# Patient Record
Sex: FEMALE | Race: WHITE | NOT HISPANIC OR LATINO | Employment: FULL TIME | URBAN - METROPOLITAN AREA
[De-identification: names, ages, dates, MRNs, and addresses within clinical notes are randomized per-mention and may not be internally consistent; named-entity substitution may affect disease eponyms.]

---

## 2017-05-30 RX ORDER — LEVOTHYROXINE SODIUM 0.05 MG/1
TABLET ORAL
Qty: 90 TABLET | Refills: 0 | Status: SHIPPED | OUTPATIENT
Start: 2017-05-30 | End: 2017-09-03 | Stop reason: SDUPTHER

## 2017-09-03 RX ORDER — LEVOTHYROXINE SODIUM 0.05 MG/1
TABLET ORAL
Qty: 90 TABLET | Refills: 0 | Status: SHIPPED | OUTPATIENT
Start: 2017-09-03 | End: 2017-12-07 | Stop reason: SDUPTHER

## 2017-12-07 RX ORDER — LEVOTHYROXINE SODIUM 0.05 MG/1
TABLET ORAL
Qty: 30 TABLET | Refills: 0 | Status: SHIPPED | OUTPATIENT
Start: 2017-12-07 | End: 2018-01-09 | Stop reason: SDUPTHER

## 2018-01-09 RX ORDER — LEVOTHYROXINE SODIUM 0.05 MG/1
TABLET ORAL
Qty: 30 TABLET | Refills: 0 | Status: SHIPPED | OUTPATIENT
Start: 2018-01-09 | End: 2018-01-26 | Stop reason: SDUPTHER

## 2018-01-26 ENCOUNTER — OFFICE VISIT (OUTPATIENT)
Dept: ENDOCRINOLOGY | Facility: CLINIC | Age: 33
End: 2018-01-26

## 2018-01-26 VITALS
OXYGEN SATURATION: 99 % | BODY MASS INDEX: 37.21 KG/M2 | HEIGHT: 63 IN | SYSTOLIC BLOOD PRESSURE: 124 MMHG | DIASTOLIC BLOOD PRESSURE: 78 MMHG | HEART RATE: 67 BPM | WEIGHT: 210 LBS

## 2018-01-26 DIAGNOSIS — E78.1 PRIMARY HYPERTRIGLYCERIDEMIA: ICD-10-CM

## 2018-01-26 DIAGNOSIS — E03.9 ACQUIRED HYPOTHYROIDISM: ICD-10-CM

## 2018-01-26 DIAGNOSIS — E78.2 MIXED HYPERLIPIDEMIA: Primary | ICD-10-CM

## 2018-01-26 DIAGNOSIS — E55.9 VITAMIN D DEFICIENCY: ICD-10-CM

## 2018-01-26 LAB
ALBUMIN SERPL-MCNC: 4.3 G/DL (ref 3.2–4.8)
ALBUMIN/GLOB SERPL: 2 G/DL (ref 1.5–2.5)
ALP SERPL-CCNC: 60 U/L (ref 25–100)
ALT SERPL-CCNC: 12 U/L (ref 7–40)
AST SERPL-CCNC: 14 U/L (ref 0–33)
BILIRUB SERPL-MCNC: 0.6 MG/DL (ref 0.3–1.2)
BUN SERPL-MCNC: 17 MG/DL (ref 9–23)
BUN/CREAT SERPL: 21.3 (ref 7–25)
CALCIUM SERPL-MCNC: 9.5 MG/DL (ref 8.7–10.4)
CHLORIDE SERPL-SCNC: 105 MMOL/L (ref 99–109)
CHOLEST SERPL-MCNC: 204 MG/DL (ref 0–200)
CO2 SERPL-SCNC: 26 MMOL/L (ref 20–31)
CREAT SERPL-MCNC: 0.8 MG/DL (ref 0.6–1.3)
GFR SERPLBLD CREATININE-BSD FMLA CKD-EPI: 101 ML/MIN/1.73
GFR SERPLBLD CREATININE-BSD FMLA CKD-EPI: 84 ML/MIN/1.73
GLOBULIN SER CALC-MCNC: 2.2 GM/DL
GLUCOSE SERPL-MCNC: 88 MG/DL (ref 70–100)
HDLC SERPL-MCNC: 68 MG/DL (ref 40–60)
LDLC SERPL CALC-MCNC: 122 MG/DL (ref 0–100)
POTASSIUM SERPL-SCNC: 4.3 MMOL/L (ref 3.5–5.5)
PROT SERPL-MCNC: 6.5 G/DL (ref 5.7–8.2)
SODIUM SERPL-SCNC: 139 MMOL/L (ref 132–146)
T4 FREE SERPL-MCNC: 1.37 NG/DL (ref 0.89–1.76)
TRIGL SERPL-MCNC: 71 MG/DL (ref 0–150)
TSH SERPL DL<=0.005 MIU/L-ACNC: 1.13 MIU/ML (ref 0.35–5.35)
VLDLC SERPL CALC-MCNC: 14.2 MG/DL

## 2018-01-26 PROCEDURE — 99214 OFFICE O/P EST MOD 30 MIN: CPT | Performed by: INTERNAL MEDICINE

## 2018-01-26 RX ORDER — LEVOTHYROXINE SODIUM 0.05 MG/1
50 TABLET ORAL DAILY
Qty: 90 TABLET | Refills: 3 | Status: SHIPPED | OUTPATIENT
Start: 2018-01-26 | End: 2019-01-04 | Stop reason: SDUPTHER

## 2018-01-26 RX ORDER — AMMONIUM LACTATE 120 MG/G
CREAM TOPICAL AS NEEDED
COMMUNITY
End: 2023-01-16 | Stop reason: SDUPTHER

## 2018-01-26 NOTE — PROGRESS NOTES
Marly Snyder 33 y.o.  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]     Overview Note:     Impression: 12/03/2015 - no signs/ symptoms of pheo   check acth and cortisol, check renin and aldosterone;      • Primary hypertriglyceridemia [E78.1]     Overview Note:     Impression: 12/03/2015 - check flp  Impression: 05/01/2015 - check flp;      • Hyperlipidemia [E78.5]     Overview Note:     Impression: 12/03/2015 - update flp  Impression: 05/01/2015 - check flp;      • Hypothyroidism [E03.9]     Overview Note:     Impression: 12/03/2015 - check tfts  Impression: 05/01/2015 - check tfts  doing well;      • Vitamin D deficiency [E55.9]     Overview Note:     Impression: 12/03/2015 - update vitamin D level  Impression: 05/01/2015 - update vitamin D levels;        Review of Systems   Constitutional: Negative for activity change, appetite change, chills, diaphoresis, fatigue, fever and unexpected weight change.   HENT: Negative for congestion, dental problem, drooling, ear discharge, ear pain, facial swelling, hearing loss, mouth sores, nosebleeds, postnasal drip, rhinorrhea, sinus pressure, sneezing, sore throat, tinnitus, trouble swallowing and voice change.    Eyes: Negative for photophobia, pain, discharge, redness, itching and visual disturbance.   Respiratory: Negative for apnea, cough, choking, chest tightness, shortness of breath, wheezing and stridor.   "  Cardiovascular: Negative for chest pain, palpitations and leg swelling.   Gastrointestinal: Negative for abdominal distention, abdominal pain, anal bleeding, blood in stool, constipation, diarrhea, nausea, rectal pain and vomiting.   Endocrine: Negative for cold intolerance, heat intolerance, polydipsia, polyphagia and polyuria.   Genitourinary: Negative for decreased urine volume, difficulty urinating, dysuria, enuresis, flank pain, frequency, genital sores, hematuria and urgency.   Musculoskeletal: Negative for arthralgias, back pain, gait problem, joint swelling, myalgias, neck pain and neck stiffness.   Skin: Negative for color change, pallor, rash and wound.   Allergic/Immunologic: Negative for environmental allergies, food allergies and immunocompromised state.   Neurological: Negative for dizziness, tremors, seizures, syncope, facial asymmetry, speech difficulty, weakness, light-headedness, numbness and headaches.   Hematological: Negative for adenopathy. Does not bruise/bleed easily.   Psychiatric/Behavioral: Negative for agitation, behavioral problems, confusion, decreased concentration, dysphoric mood, hallucinations, self-injury, sleep disturbance and suicidal ideas. The patient is not nervous/anxious and is not hyperactive.      Social History     Social History   • Marital status: Single     Spouse name: N/A   • Number of children: N/A   • Years of education: N/A     Occupational History   • Not on file.     Social History Main Topics   • Smoking status: Never Smoker   • Smokeless tobacco: Never Used   • Alcohol use Yes      Comment: occasionally   • Drug use: No   • Sexual activity: Defer     Other Topics Concern   • Not on file     Social History Narrative     Family History   Problem Relation Age of Onset   • Hypertension Father    • Hyperlipidemia Father    • Coronary artery disease Father    • Cancer Paternal Grandfather    • Breast cancer Other      /78  Pulse 67  Ht 160 cm (63\")  Wt " 95.3 kg (210 lb)  LMP 01/18/2018  SpO2 99%  BMI 37.2 kg/m2  Physical Exam   Constitutional: She is oriented to person, place, and time. She appears well-developed and well-nourished.   HENT:   Head: Normocephalic and atraumatic.   Nose: Nose normal.   Mouth/Throat: Oropharynx is clear and moist.   Eyes: Conjunctivae, EOM and lids are normal. Pupils are equal, round, and reactive to light.   Neck: Trachea normal and normal range of motion. Neck supple. Carotid bruit is not present. No tracheal deviation present. No thyroid mass and no thyromegaly present.   Cardiovascular: Normal rate, regular rhythm, normal heart sounds and intact distal pulses.  Exam reveals no gallop and no friction rub.    No murmur heard.  Pulmonary/Chest: Effort normal and breath sounds normal. No respiratory distress. She has no wheezes.   Musculoskeletal: Normal range of motion. She exhibits no edema or deformity.   Lymphadenopathy:     She has no cervical adenopathy.   Neurological: She is alert and oriented to person, place, and time. She has normal reflexes. She displays normal reflexes. No cranial nerve deficit.   Skin: Skin is warm and dry. No rash noted. No cyanosis or erythema. Nails show no clubbing.   Psychiatric: She has a normal mood and affect. Her speech is normal and behavior is normal. Judgment and thought content normal. Cognition and memory are normal.   Nursing note and vitals reviewed.    Results for orders placed or performed in visit on 12/09/16   TSH   Result Value Ref Range    TSH 0.857 0.350 - 5.350 mIU/mL   T4, Free   Result Value Ref Range    Free T4 1.36 0.89 - 1.76 ng/dL   Comprehensive Metabolic Panel   Result Value Ref Range    Glucose 93 70 - 100 mg/dL    BUN 13 9 - 23 mg/dL    Creatinine 0.80 0.60 - 1.30 mg/dL    Sodium 140 132 - 146 mmol/L    Potassium 4.0 3.5 - 5.5 mmol/L    Chloride 105 99 - 109 mmol/L    CO2 26.0 20.0 - 31.0 mmol/L    Calcium 10.0 8.7 - 10.4 mg/dL    Total Protein 6.7 5.7 - 8.2 g/dL     Albumin 4.30 3.20 - 4.80 g/dL    ALT (SGPT) 18 7 - 40 U/L    AST (SGOT) 20 0 - 33 U/L    Alkaline Phosphatase 83 25 - 100 U/L    Total Bilirubin 0.5 0.3 - 1.2 mg/dL    eGFR Non African Amer 85 >60 mL/min/1.73    Globulin 2.4 gm/dL    A/G Ratio 1.8 g/dL    BUN/Creatinine Ratio 16.3 7.0 - 25.0    Anion Gap 9.0 3.0 - 11.0 mmol/L   DHEA-Sulfate   Result Value Ref Range    DHEA-Sulfate 214.9 84.8 - 378.0 ug/dL   Testosterone   Result Value Ref Range    Testosterone, Total 50.56 10.00 - 1500.00 ng/dL   ACTH   Result Value Ref Range    ACTH 17.3 7.2 - 63.3 pg/mL   Cortisol   Result Value Ref Range    Cortisol 13.80 mcg/dL   Catecholamines, Fractionated, Plasma   Result Value Ref Range    Norepinephrine 452 0 - 874 pg/mL    Epinephrine 19 0 - 62 pg/mL    Dopamine <30 0 - 48 pg/mL   Aldosterone   Result Value Ref Range    Aldosterone 9.3 0.0 - 30.0 ng/dL   Renin Direct Assay   Result Value Ref Range    Renin Activity 1.48 ng/mL/hr   Vitamin D 25 Hydroxy   Result Value Ref Range    25 Hydroxy, Vitamin D 25.4 ng/ml   Lipid Panel   Result Value Ref Range    Total Cholesterol 214 (H) 0 - 200 mg/dL    Triglycerides 80 0 - 150 mg/dL    HDL Cholesterol 49 40 - 60 mg/dL    LDL Cholesterol  136 (H) 0 - 130 mg/dL     Problem List Items Addressed This Visit        Cardiovascular and Mediastinum    Primary hypertriglyceridemia     See above          Relevant Orders    TSH (Completed)    T4, Free (Completed)    Comprehensive Metabolic Panel (Completed)    Lipid Panel (Completed)    Hyperlipidemia - Primary     Check flp          Relevant Orders    TSH (Completed)    T4, Free (Completed)    Comprehensive Metabolic Panel (Completed)    Lipid Panel (Completed)       Digestive    Vitamin D deficiency     Continue supplement and repeat levels yearly          Relevant Orders    TSH (Completed)    T4, Free (Completed)    Comprehensive Metabolic Panel (Completed)    Lipid Panel (Completed)       Endocrine    Hypothyroidism     Check tfts           Relevant Medications    levothyroxine (SYNTHROID, LEVOTHROID) 50 MCG tablet    Other Relevant Orders    TSH (Completed)    T4, Free (Completed)    Comprehensive Metabolic Panel (Completed)    Lipid Panel (Completed)    Adrenal cortical adenoma     Find old records- patient denies prior h/o adenoma- may be erroneous diagnosis          Relevant Orders    TSH (Completed)    T4, Free (Completed)    Comprehensive Metabolic Panel (Completed)    Lipid Panel (Completed)        Return in about 1 year (around 1/26/2019) for Recheck 30 min .    Tesha Conrad MA  Signed Vi Farfan MD

## 2018-02-14 RX ORDER — AMMONIUM LACTATE 12 G/100G
LOTION TOPICAL
Qty: 225 G | Refills: 4 | Status: SHIPPED | OUTPATIENT
Start: 2018-02-14 | End: 2019-05-02 | Stop reason: SDUPTHER

## 2018-11-07 ENCOUNTER — OFFICE VISIT (OUTPATIENT)
Dept: FAMILY MEDICINE CLINIC | Facility: CLINIC | Age: 33
End: 2018-11-07

## 2018-11-07 VITALS
OXYGEN SATURATION: 99 % | TEMPERATURE: 97.8 F | RESPIRATION RATE: 16 BRPM | HEART RATE: 80 BPM | SYSTOLIC BLOOD PRESSURE: 136 MMHG | DIASTOLIC BLOOD PRESSURE: 70 MMHG | HEIGHT: 63 IN | WEIGHT: 217 LBS | BODY MASS INDEX: 38.45 KG/M2

## 2018-11-07 DIAGNOSIS — E03.9 HYPOTHYROIDISM, UNSPECIFIED TYPE: Primary | ICD-10-CM

## 2018-11-07 PROCEDURE — 99213 OFFICE O/P EST LOW 20 MIN: CPT | Performed by: NURSE PRACTITIONER

## 2018-11-07 RX ORDER — HEPATITIS A VACCINE 1440 [IU]/ML
INJECTION, SUSPENSION INTRAMUSCULAR
Refills: 0 | COMMUNITY
Start: 2018-10-17 | End: 2020-01-10

## 2018-11-07 RX ORDER — INFLUENZA A VIRUS A/MICHIGAN/45/2015 X-275 (H1N1) ANTIGEN (FORMALDEHYDE INACTIVATED), INFLUENZA A VIRUS A/SINGAPORE/INFIMH-16-0019/2016 IVR-186 (H3N2) ANTIGEN (FORMALDEHYDE INACTIVATED), INFLUENZA B VIRUS B/PHUKET/3073/2013 ANTIGEN (FORMALDEHYDE INACTIVATED), AND INFLUENZA B VIRUS B/MARYLAND/15/2016 BX-69A ANTIGEN (FORMALDEHYDE INACTIVATED) 15; 15; 15; 15 UG/.5ML; UG/.5ML; UG/.5ML; UG/.5ML
INJECTION, SUSPENSION INTRAMUSCULAR
Refills: 0 | COMMUNITY
Start: 2018-10-17 | End: 2020-01-10

## 2018-11-07 NOTE — PROGRESS NOTES
"Marly Snyder is a 33 y.o. female. Patient presents to office today as a new patient needing to establish care. Also needs to discuss health concerns, including Hypothyroidism.  Is followed by Dr. Farfan an endocrinologist  Is followed by Ob at [facility]  Is an   Had a flu and Hep A shot and then 3 days later she has hives.          Assessment/Plan   Problem List Items Addressed This Visit     None             No Follow-up on file.  There are no Patient Instructions on file for this visit.    Chief Complaint   Patient presents with   • Annual Exam     new pt to be established; discuss hypothyroidism     Social History   Substance Use Topics   • Smoking status: Never Smoker   • Smokeless tobacco: Never Used   • Alcohol use Yes      Comment: occasionally       History of Present Illness     The following portions of the patient's history were reviewed and updated as appropriate:PMHroutine: Social history , Allergies, Current Medications, Active Problem List and Health Maintenance    Review of Systems   Constitutional: Negative for activity change, appetite change, fatigue and fever.   HENT: Negative for mouth sores.    Respiratory: Negative for cough and choking.    Cardiovascular: Negative for chest pain.   Skin: Positive for rash.   Neurological: Negative for dizziness.       Objective   Vitals:    11/07/18 0906   BP: 136/70   BP Location: Left arm   Patient Position: Sitting   Cuff Size: Adult   Pulse: 80   Resp: 16   Temp: 97.8 °F (36.6 °C)   TempSrc: Oral   SpO2: 99%   Weight: 98.4 kg (217 lb)   Height: 160 cm (62.99\")     Body mass index is 38.45 kg/m².  Physical Exam   Constitutional: She appears well-developed and well-nourished. No distress.   HENT:   Head: Normocephalic and atraumatic.   Right Ear: External ear normal.   Left Ear: External ear normal.   Mouth/Throat: Oropharynx is clear and moist.   Eyes: EOM are normal.   Neck: Neck supple.   Cardiovascular: Normal rate and regular rhythm.  "   Pulmonary/Chest: Effort normal and breath sounds normal.   Abdominal: Soft.   Musculoskeletal: Normal range of motion.   Neurological: She is alert.   Skin: Skin is warm.   Nursing note and vitals reviewed.    Reviewed Data:  No visits with results within 1 Month(s) from this visit.   Latest known visit with results is:   Office Visit on 01/26/2018   Component Date Value Ref Range Status   • TSH 01/26/2018 1.128  0.350 - 5.350 mIU/mL Final   • Free T4 01/26/2018 1.37  0.89 - 1.76 ng/dL Final   • Glucose 01/26/2018 88  70 - 100 mg/dL Final   • BUN 01/26/2018 17  9 - 23 mg/dL Final   • Creatinine 01/26/2018 0.80  0.60 - 1.30 mg/dL Final   • eGFR Non  Am 01/26/2018 84  >60 mL/min/1.73 Final    Comment: [employer] Guidelines  Stage     Description        GFR  1         Normal or High     90+  2         Mild decrease      60-89  3         Moderate decrease  30-59  4         Severe decrease    15-29  5         Kidney failure     <15     • eGFR  Am 01/26/2018 101  >60 mL/min/1.73 Final   • BUN/Creatinine Ratio 01/26/2018 21.3  7.0 - 25.0 Final   • Sodium 01/26/2018 139  132 - 146 mmol/L Final   • Potassium 01/26/2018 4.3  3.5 - 5.5 mmol/L Final   • Chloride 01/26/2018 105  99 - 109 mmol/L Final   • Total CO2 01/26/2018 26.0  20.0 - 31.0 mmol/L Final   • Calcium 01/26/2018 9.5  8.7 - 10.4 mg/dL Final   • Total Protein 01/26/2018 6.5  5.7 - 8.2 g/dL Final   • Albumin 01/26/2018 4.30  3.20 - 4.80 g/dL Final   • Globulin 01/26/2018 2.2  gm/dL Final   • A/G Ratio 01/26/2018 2.0  1.5 - 2.5 g/dL Final   • Total Bilirubin 01/26/2018 0.6  0.3 - 1.2 mg/dL Final   • Alkaline Phosphatase 01/26/2018 60  25 - 100 U/L Final   • AST (SGOT) 01/26/2018 14  0 - 33 U/L Final   • ALT (SGPT) 01/26/2018 12  7 - 40 U/L Final   • Total Cholesterol 01/26/2018 204* 0 - 200 mg/dL Final    Comment: Cholesterol Reference Ranges:   Desirable       < 200 mg/dL   Borderline    200-239 mg/dL   High Risk       > 239  mg/dL  Triglyceride Reference Ranges:   Normal          < 150 mg/dL   Borderline    150-199 mg/dL   High          200-499 mg/dL   Very High       > 499 mg/dL  HDL Reference Ranges:   Low              < 40 mg/dL   High             > 59 mg/dL  LDL Reference Ranges:   Optimal         < 100 mg/dL   Near Optimal  100-129 mg/dL   Borderline    130-159 mg/dL   High          160-189 mg/dL   Very High       > 189 mg/dL     • Triglycerides 01/26/2018 71  0 - 150 mg/dL Final   • HDL Cholesterol 01/26/2018 68* 40 - 60 mg/dL Final   • VLDL Cholesterol 01/26/2018 14.2  mg/dL Final   • LDL Cholesterol  01/26/2018 122* 0 - 100 mg/dL Final

## 2019-01-04 ENCOUNTER — OFFICE VISIT (OUTPATIENT)
Dept: ENDOCRINOLOGY | Facility: CLINIC | Age: 34
End: 2019-01-04

## 2019-01-04 VITALS
OXYGEN SATURATION: 100 % | BODY MASS INDEX: 38.79 KG/M2 | DIASTOLIC BLOOD PRESSURE: 76 MMHG | SYSTOLIC BLOOD PRESSURE: 112 MMHG | HEIGHT: 63 IN | HEART RATE: 64 BPM | WEIGHT: 218.9 LBS

## 2019-01-04 DIAGNOSIS — E78.2 MIXED HYPERLIPIDEMIA: Primary | ICD-10-CM

## 2019-01-04 DIAGNOSIS — E03.9 HYPOTHYROIDISM, UNSPECIFIED TYPE: ICD-10-CM

## 2019-01-04 DIAGNOSIS — E55.9 VITAMIN D DEFICIENCY: ICD-10-CM

## 2019-01-04 LAB
25(OH)D3+25(OH)D2 SERPL-MCNC: 11.8 NG/ML
ALBUMIN SERPL-MCNC: 4.43 G/DL (ref 3.2–4.8)
ALBUMIN/GLOB SERPL: 2.5 G/DL (ref 1.5–2.5)
ALP SERPL-CCNC: 56 U/L (ref 25–100)
ALT SERPL-CCNC: 15 U/L (ref 7–40)
AST SERPL-CCNC: 15 U/L (ref 0–33)
BILIRUB SERPL-MCNC: 0.7 MG/DL (ref 0.3–1.2)
BUN SERPL-MCNC: 15 MG/DL (ref 9–23)
BUN/CREAT SERPL: 22.7 (ref 7–25)
CALCIUM SERPL-MCNC: 9.1 MG/DL (ref 8.7–10.4)
CHLORIDE SERPL-SCNC: 104 MMOL/L (ref 99–109)
CHOLEST SERPL-MCNC: 193 MG/DL (ref 0–200)
CO2 SERPL-SCNC: 25 MMOL/L (ref 20–31)
CREAT SERPL-MCNC: 0.66 MG/DL (ref 0.6–1.3)
GLOBULIN SER CALC-MCNC: 1.8 GM/DL
GLUCOSE SERPL-MCNC: 82 MG/DL (ref 70–100)
HDLC SERPL-MCNC: 66 MG/DL (ref 40–60)
LDLC SERPL CALC-MCNC: 111 MG/DL (ref 0–100)
POTASSIUM SERPL-SCNC: 3.9 MMOL/L (ref 3.5–5.5)
PROT SERPL-MCNC: 6.2 G/DL (ref 5.7–8.2)
SODIUM SERPL-SCNC: 136 MMOL/L (ref 132–146)
T4 FREE SERPL-MCNC: 1.41 NG/DL (ref 0.89–1.76)
TRIGL SERPL-MCNC: 79 MG/DL (ref 0–150)
TSH SERPL DL<=0.005 MIU/L-ACNC: 1.23 MIU/ML (ref 0.35–5.35)
VLDLC SERPL CALC-MCNC: 15.8 MG/DL

## 2019-01-04 PROCEDURE — 99213 OFFICE O/P EST LOW 20 MIN: CPT | Performed by: INTERNAL MEDICINE

## 2019-01-04 RX ORDER — LEVOTHYROXINE SODIUM 0.05 MG/1
50 TABLET ORAL DAILY
Qty: 90 TABLET | Refills: 3 | Status: SHIPPED | OUTPATIENT
Start: 2019-01-04 | End: 2020-02-04

## 2019-01-04 NOTE — PROGRESS NOTES
Marly Snyder 33 y.o.  CC:Yearly FU; Hypothyroidism; and Vitamin D Deficiency      [place]: Yearly FU; Hypothyroidism; and Vitamin D Deficiency    bp is good  Energy is good- is on synthroid 50 mcg daily  Is on vitamin D supplement   Had possible reaction to flu shot- shot on Thursday and then on Sunday had rash with hives   Discussed allergy screening for flu vaccine and risk of anaphylaxis with second reaction     Allergies   Allergen Reactions   • Morphine Anaphylaxis   • Sulfa Antibiotics Rash       Current Outpatient Medications:   •  levonorgestrel (KYLEENA) 19.5 MG intrauterine device IUD, 1 each by Intrauterine route 1 (One) Time., Disp: , Rfl:   •  Tdap (ADACEL) 5-2-15.5 LF-MCG/0.5 injection, , Disp: , Rfl:   •  ammonium lactate (AMLACTIN) 12 % cream, Apply  topically As Needed for Dry Skin., Disp: , Rfl:   •  ammonium lactate (LAC-HYDRIN) 12 % lotion, APPLY TOPICALLY TO AFFECTED AREA(S) TWICE A DAY, Disp: 225 g, Rfl: 4  •  FLUZONE QUADRIVALENT 0.5 ML suspension prefilled syringe injection, inject 0.5 milliliter intramuscularly, Disp: , Rfl: 0  •  HAVRIX 1440 EL U/ML vaccine, inject 1 milliliter intramuscularly, Disp: , Rfl: 0  •  levothyroxine (SYNTHROID, LEVOTHROID) 50 MCG tablet, Take 1 tablet by mouth Daily., Disp: 90 tablet, Rfl: 3  Patient Active Problem List    Diagnosis   • Primary hypertriglyceridemia [E78.1]   • Hyperlipidemia [E78.5]   • Hypothyroidism [E03.9]   • Vitamin D deficiency [E55.9]     Review of Systems   Constitutional: Negative for activity change, appetite change, chills, diaphoresis, fatigue, fever and unexpected weight change.   HENT: Negative for congestion, dental problem, drooling, ear discharge, ear pain, facial swelling, hearing loss, mouth sores, nosebleeds, postnasal drip, rhinorrhea, sinus pressure, sneezing, sore throat, tinnitus, trouble swallowing and voice change.    Eyes: Negative for photophobia, pain, discharge, redness, itching and visual disturbance.   Respiratory:  Negative for apnea, cough, choking, chest tightness, shortness of breath, wheezing and stridor.    Cardiovascular: Negative for chest pain, palpitations and leg swelling.   Gastrointestinal: Negative for abdominal distention, abdominal pain, anal bleeding, blood in stool, constipation, diarrhea, nausea, rectal pain and vomiting.   Endocrine: Negative for cold intolerance, heat intolerance, polydipsia, polyphagia and polyuria.   Genitourinary: Negative for decreased urine volume, difficulty urinating, dysuria, enuresis, flank pain, frequency, genital sores, hematuria and urgency.   Musculoskeletal: Negative for arthralgias, back pain, gait problem, joint swelling, myalgias, neck pain and neck stiffness.   Skin: Negative for color change, pallor, rash and wound.   Allergic/Immunologic: Negative for environmental allergies, food allergies and immunocompromised state.   Neurological: Negative for dizziness, tremors, seizures, syncope, facial asymmetry, speech difficulty, weakness, light-headedness, numbness and headaches.   Hematological: Negative for adenopathy. Does not bruise/bleed easily.   Psychiatric/Behavioral: Negative for agitation, behavioral problems, confusion, decreased concentration, dysphoric mood, hallucinations, self-injury, sleep disturbance and suicidal ideas. The patient is not nervous/anxious and is not hyperactive.      Social History     Socioeconomic History   • Marital status: Single     Spouse name: Not on file   • Number of children: Not on file   • Years of education: Not on file   • Highest education level: Not on file   Social Needs   • Financial resource strain: Not on file   • Food insecurity - worry: Not on file   • Food insecurity - inability: Not on file   • Transportation needs - medical: Not on file   • Transportation needs - non-medical: Not on file   Occupational History   • Not on file   Tobacco Use   • Smoking status: Never Smoker   • Smokeless tobacco: Never Used   Substance and  "Sexual Activity   • Alcohol use: Yes     Comment: occasionally   • Drug use: No   • Sexual activity: Defer   Other Topics Concern   • Not on file   Social History Narrative   • Not on file     Family History   Problem Relation Age of Onset   • Hypertension Father    • Hyperlipidemia Father    • Coronary artery disease Father    • Cancer Paternal Grandfather    • Breast cancer Other      /76   Pulse 64   Ht 160 cm (63\")   Wt 99.3 kg (218 lb 14.4 oz)   SpO2 100%   BMI 38.78 kg/m²   Physical Exam   Constitutional: She is oriented to person, place, and time. She appears well-developed and well-nourished.   HENT:   Head: Normocephalic and atraumatic.   Nose: Nose normal.   Mouth/Throat: Oropharynx is clear and moist.   Eyes: Conjunctivae, EOM and lids are normal. Pupils are equal, round, and reactive to light.   Neck: Trachea normal and normal range of motion. Neck supple. Carotid bruit is not present. No tracheal deviation present. No thyroid mass and no thyromegaly present.   Cardiovascular: Normal rate, regular rhythm, normal heart sounds and intact distal pulses. Exam reveals no gallop and no friction rub.   No murmur heard.  Pulmonary/Chest: Effort normal and breath sounds normal. No respiratory distress. She has no wheezes.   Musculoskeletal: Normal range of motion. She exhibits no edema or deformity.   Lymphadenopathy:     She has no cervical adenopathy.   Neurological: She is alert and oriented to person, place, and time. She has normal reflexes. She displays normal reflexes. No cranial nerve deficit.   Skin: Skin is warm and dry. No rash noted. No cyanosis or erythema. Nails show no clubbing.   Psychiatric: She has a normal mood and affect. Her speech is normal and behavior is normal. Judgment and thought content normal. Cognition and memory are normal.   Nursing note and vitals reviewed.    Results for orders placed or performed in visit on 01/26/18   TSH   Result Value Ref Range    TSH 1.128 0.350 - " 5.350 mIU/mL   T4, Free   Result Value Ref Range    Free T4 1.37 0.89 - 1.76 ng/dL   Comprehensive Metabolic Panel   Result Value Ref Range    Glucose 88 70 - 100 mg/dL    BUN 17 9 - 23 mg/dL    Creatinine 0.80 0.60 - 1.30 mg/dL    eGFR Non African Am 84 >60 mL/min/1.73    eGFR African Am 101 >60 mL/min/1.73    BUN/Creatinine Ratio 21.3 7.0 - 25.0    Sodium 139 132 - 146 mmol/L    Potassium 4.3 3.5 - 5.5 mmol/L    Chloride 105 99 - 109 mmol/L    Total CO2 26.0 20.0 - 31.0 mmol/L    Calcium 9.5 8.7 - 10.4 mg/dL    Total Protein 6.5 5.7 - 8.2 g/dL    Albumin 4.30 3.20 - 4.80 g/dL    Globulin 2.2 gm/dL    A/G Ratio 2.0 1.5 - 2.5 g/dL    Total Bilirubin 0.6 0.3 - 1.2 mg/dL    Alkaline Phosphatase 60 25 - 100 U/L    AST (SGOT) 14 0 - 33 U/L    ALT (SGPT) 12 7 - 40 U/L   Lipid Panel   Result Value Ref Range    Total Cholesterol 204 (H) 0 - 200 mg/dL    Triglycerides 71 0 - 150 mg/dL    HDL Cholesterol 68 (H) 40 - 60 mg/dL    VLDL Cholesterol 14.2 mg/dL    LDL Cholesterol  122 (H) 0 - 100 mg/dL     Problem List Items Addressed This Visit        Cardiovascular and Mediastinum    Hyperlipidemia - Primary     Check flp          Relevant Orders    Comprehensive Metabolic Panel    Lipid Panel       Digestive    Vitamin D deficiency     Update vitamin D levels          Relevant Orders    Vitamin D 25 Hydroxy       Endocrine    Hypothyroidism     Check tfts          Relevant Medications    levothyroxine (SYNTHROID, LEVOTHROID) 50 MCG tablet    Other Relevant Orders    TSH    T4, Free        Return in about 1 year (around 1/4/2020) for Recheck 30 min .    Tesha Conrad MA  Signed Vi Farfan MD

## 2019-01-06 ENCOUNTER — TELEPHONE (OUTPATIENT)
Dept: ENDOCRINOLOGY | Facility: CLINIC | Age: 34
End: 2019-01-06

## 2019-01-06 RX ORDER — ERGOCALCIFEROL 1.25 MG/1
50000 CAPSULE ORAL WEEKLY
Qty: 5 CAPSULE | Refills: 5 | Status: SHIPPED | OUTPATIENT
Start: 2019-01-06 | End: 2019-12-12 | Stop reason: SDUPTHER

## 2019-05-02 RX ORDER — AMMONIUM LACTATE 12 G/100G
LOTION TOPICAL
Qty: 225 G | Refills: 1 | Status: SHIPPED | OUTPATIENT
Start: 2019-05-02 | End: 2020-02-12

## 2019-06-07 ENCOUNTER — TELEPHONE (OUTPATIENT)
Dept: INTERNAL MEDICINE | Facility: CLINIC | Age: 34
End: 2019-06-07

## 2019-12-12 RX ORDER — ERGOCALCIFEROL 1.25 MG/1
CAPSULE ORAL
Qty: 5 CAPSULE | Refills: 5 | Status: SHIPPED | OUTPATIENT
Start: 2019-12-12 | End: 2020-08-25

## 2020-01-03 ENCOUNTER — TELEPHONE (OUTPATIENT)
Dept: ENDOCRINOLOGY | Facility: CLINIC | Age: 35
End: 2020-01-03

## 2020-01-03 NOTE — TELEPHONE ENCOUNTER
I contacted pt today to get her appt rescheduled on 1/10 since Dr. Farfan will not be in the office at the time of her appt. I attempted to reschedule her appt but she is wanting to know of recommendations of Endocrinologist in the [place] area. Please advise. Thanks!

## 2020-01-06 NOTE — TELEPHONE ENCOUNTER
[facility] for patient to return my call if she would like to schedule with Dr. Farfan. Also, informed her that Dr. Farfan does not know of any Endocrinologist in the [facility].

## 2020-01-10 ENCOUNTER — OFFICE VISIT (OUTPATIENT)
Dept: ENDOCRINOLOGY | Facility: CLINIC | Age: 35
End: 2020-01-10

## 2020-01-10 VITALS
HEART RATE: 69 BPM | WEIGHT: 230.2 LBS | HEIGHT: 63 IN | OXYGEN SATURATION: 99 % | DIASTOLIC BLOOD PRESSURE: 80 MMHG | SYSTOLIC BLOOD PRESSURE: 126 MMHG | BODY MASS INDEX: 40.79 KG/M2

## 2020-01-10 DIAGNOSIS — E78.2 MIXED HYPERLIPIDEMIA: Primary | ICD-10-CM

## 2020-01-10 DIAGNOSIS — E55.9 VITAMIN D DEFICIENCY: ICD-10-CM

## 2020-01-10 DIAGNOSIS — E03.9 HYPOTHYROIDISM, UNSPECIFIED TYPE: ICD-10-CM

## 2020-01-10 PROCEDURE — 99214 OFFICE O/P EST MOD 30 MIN: CPT | Performed by: INTERNAL MEDICINE

## 2020-01-10 RX ORDER — SPIRONOLACTONE 50 MG/1
100 TABLET, FILM COATED ORAL DAILY
COMMUNITY
Start: 2019-12-10

## 2020-01-10 NOTE — PROGRESS NOTES
Marly Snyder 34 y.o.  CC: Hypothyroidism (Pt here today for 1 year follow up) and Vitamin D Deficiency      [place]: Hypothyroidism (Pt here today for 1 year follow up) and Vitamin D Deficiency  energy good   No missed doses or problems with dosing   Is not taking vitamin d supplement or at best taking intermittently   Is on synthroid 50 mcg daily     Allergies   Allergen Reactions   • Morphine Anaphylaxis   • Sulfa Antibiotics Rash       Current Outpatient Medications:   •  ammonium lactate (AMLACTIN) 12 % cream, Apply  topically As Needed for Dry Skin., Disp: , Rfl:   •  ammonium lactate (LAC-HYDRIN) 12 % lotion, APPLY TO AFFECTED AREA(S) TWO TIMES A DAY, Disp: 225 g, Rfl: 1  •  levonorgestrel (KYLEENA) 19.5 MG intrauterine device IUD, 1 each by Intrauterine route 1 (One) Time., Disp: , Rfl:   •  levothyroxine (SYNTHROID, LEVOTHROID) 50 MCG tablet, Take 1 tablet by mouth Daily., Disp: 90 tablet, Rfl: 3  •  spironolactone (ALDACTONE) 50 MG tablet, , Disp: , Rfl:   •  vitamin D (ERGOCALCIFEROL) 1.25 MG (39811 UT) capsule capsule, TAKE ONE CAPSULE BY MOUTH ONCE WEEKLY, Disp: 5 capsule, Rfl: 5  Patient Active Problem List    Diagnosis   • Primary hypertriglyceridemia [E78.1]   • Hyperlipidemia [E78.5]   • Hypothyroidism [E03.9]   • Vitamin D deficiency [E55.9]     Review of Systems   Constitutional: Negative for activity change, appetite change, chills, diaphoresis, fatigue, fever and unexpected weight change.   HENT: Negative for congestion, dental problem, drooling, ear discharge, ear pain, facial swelling, hearing loss, mouth sores, nosebleeds, postnasal drip, rhinorrhea, sinus pressure, sneezing, sore throat, tinnitus, trouble swallowing and voice change.    Eyes: Negative for photophobia, pain, discharge, redness, itching and visual disturbance.   Respiratory: Negative for apnea, cough, choking, chest tightness, shortness of breath, wheezing and stridor.    Cardiovascular: Negative for chest pain, palpitations and  "leg swelling.   Gastrointestinal: Negative for abdominal distention, abdominal pain, anal bleeding, blood in stool, constipation, diarrhea, nausea, rectal pain and vomiting.   Endocrine: Negative for cold intolerance, heat intolerance, polydipsia, polyphagia and polyuria.   Genitourinary: Negative for decreased urine volume, difficulty urinating, dysuria, enuresis, flank pain, frequency, genital sores, hematuria and urgency.   Musculoskeletal: Negative for arthralgias, back pain, gait problem, joint swelling, myalgias, neck pain and neck stiffness.   Skin: Negative for color change, pallor, rash and wound.   Allergic/Immunologic: Negative for environmental allergies, food allergies and immunocompromised state.   Neurological: Negative for dizziness, tremors, seizures, syncope, facial asymmetry, speech difficulty, weakness, light-headedness, numbness and headaches.   Hematological: Negative for adenopathy. Does not bruise/bleed easily.   Psychiatric/Behavioral: Negative for agitation, behavioral problems, confusion, decreased concentration, dysphoric mood, hallucinations, self-injury, sleep disturbance and suicidal ideas. The patient is not nervous/anxious and is not hyperactive.      Social History     Socioeconomic History   • Marital status: Single     Spouse name: Not on file   • Number of children: Not on file   • Years of education: Not on file   • Highest education level: Not on file   Tobacco Use   • Smoking status: Never Smoker   • Smokeless tobacco: Never Used   Substance and Sexual Activity   • Alcohol use: Yes     Comment: occasionally   • Drug use: No   • Sexual activity: Defer     Family History   Problem Relation Age of Onset   • Hypertension Father    • Hyperlipidemia Father    • Coronary artery disease Father    • Cancer Paternal Grandfather    • Breast cancer Other      /80 (BP Location: Left arm, Patient Position: Sitting, Cuff Size: Adult)   Pulse 69   Ht 160 cm (63\")   Wt 104 kg (230 lb " 3.2 oz)   LMP  (LMP Unknown)   SpO2 99%   Breastfeeding No   BMI 40.78 kg/m²   Physical Exam   Constitutional: She is oriented to person, place, and time. She appears well-developed and well-nourished.   HENT:   Head: Normocephalic and atraumatic.   Nose: Nose normal.   Mouth/Throat: Oropharynx is clear and moist.   Eyes: Pupils are equal, round, and reactive to light. Conjunctivae, EOM and lids are normal.   Neck: Trachea normal and normal range of motion. Neck supple. Carotid bruit is not present. No tracheal deviation present. No thyroid mass and no thyromegaly present.   Cardiovascular: Normal rate, regular rhythm, normal heart sounds and intact distal pulses. Exam reveals no gallop and no friction rub.   No murmur heard.  Pulmonary/Chest: Effort normal and breath sounds normal. No respiratory distress. She has no wheezes.   Musculoskeletal: Normal range of motion. She exhibits no edema or deformity.   Lymphadenopathy:     She has no cervical adenopathy.   Neurological: She is alert and oriented to person, place, and time. She has normal reflexes. She displays normal reflexes. No cranial nerve deficit.   Skin: Skin is warm and dry. No rash noted. No cyanosis or erythema. Nails show no clubbing.   Psychiatric: She has a normal mood and affect. Her speech is normal and behavior is normal. Judgment and thought content normal. Cognition and memory are normal.   Nursing note and vitals reviewed.    Results for orders placed or performed in visit on 01/04/19   Comprehensive Metabolic Panel   Result Value Ref Range    Glucose 82 70 - 100 mg/dL    BUN 15 9 - 23 mg/dL    Creatinine 0.66 0.60 - 1.30 mg/dL    eGFR Non African Am 104 >60 mL/min/1.73    eGFR African Am 127 >60 mL/min/1.73    BUN/Creatinine Ratio 22.7 7.0 - 25.0    Sodium 136 132 - 146 mmol/L    Potassium 3.9 3.5 - 5.5 mmol/L    Chloride 104 99 - 109 mmol/L    Total CO2 25.0 20.0 - 31.0 mmol/L    Calcium 9.1 8.7 - 10.4 mg/dL    Total Protein 6.2 5.7 - 8.2  g/dL    Albumin 4.43 3.20 - 4.80 g/dL    Globulin 1.8 gm/dL    A/G Ratio 2.5 1.5 - 2.5 g/dL    Total Bilirubin 0.7 0.3 - 1.2 mg/dL    Alkaline Phosphatase 56 25 - 100 U/L    AST (SGOT) 15 0 - 33 U/L    ALT (SGPT) 15 7 - 40 U/L   Lipid Panel   Result Value Ref Range    Total Cholesterol 193 0 - 200 mg/dL    Triglycerides 79 0 - 150 mg/dL    HDL Cholesterol 66 (H) 40 - 60 mg/dL    VLDL Cholesterol 15.8 mg/dL    LDL Cholesterol  111 (H) 0 - 100 mg/dL   TSH   Result Value Ref Range    TSH 1.232 0.350 - 5.350 mIU/mL   T4, Free   Result Value Ref Range    Free T4 1.41 0.89 - 1.76 ng/dL   Vitamin D 25 Hydroxy   Result Value Ref Range    25 Hydroxy, Vitamin D 11.8 ng/ml     Marly was seen today for hypothyroidism and vitamin d deficiency.    Diagnoses and all orders for this visit:    Mixed hyperlipidemia    Vitamin D deficiency    Hypothyroidism, unspecified type      Problem List Items Addressed This Visit        Cardiovascular and Mediastinum    Hyperlipidemia - Primary     Is on low fat diet- check flp             Digestive    Vitamin D deficiency     Discussed severe deficiency with vitamin D level 11 at last check   Recommended taking weekly supplement             Endocrine    Hypothyroidism     Check tfts- doing well on low dose supplementation              Return in about 6 months (around 7/10/2020) for Recheck 30 min .    Vi Farfan MD  Signed Vi Farfan MD

## 2020-01-11 LAB
25(OH)D3+25(OH)D2 SERPL-MCNC: 19.1 NG/ML (ref 30–100)
ALBUMIN SERPL-MCNC: 4.2 G/DL (ref 3.5–5.2)
ALBUMIN/GLOB SERPL: 2 G/DL
ALP SERPL-CCNC: 54 U/L (ref 39–117)
ALT SERPL-CCNC: 12 U/L (ref 1–33)
AST SERPL-CCNC: 13 U/L (ref 1–32)
BILIRUB SERPL-MCNC: 0.3 MG/DL (ref 0.2–1.2)
BUN SERPL-MCNC: 12 MG/DL (ref 6–20)
BUN/CREAT SERPL: 17.4 (ref 7–25)
CALCIUM SERPL-MCNC: 9 MG/DL (ref 8.6–10.5)
CHLORIDE SERPL-SCNC: 101 MMOL/L (ref 98–107)
CHOLEST SERPL-MCNC: 191 MG/DL (ref 0–200)
CO2 SERPL-SCNC: 25.4 MMOL/L (ref 22–29)
CREAT SERPL-MCNC: 0.69 MG/DL (ref 0.57–1)
GLOBULIN SER CALC-MCNC: 2.1 GM/DL
GLUCOSE SERPL-MCNC: 86 MG/DL (ref 65–99)
HDLC SERPL-MCNC: 64 MG/DL (ref 40–60)
LDLC SERPL CALC-MCNC: 114 MG/DL (ref 0–100)
POTASSIUM SERPL-SCNC: 4.3 MMOL/L (ref 3.5–5.2)
PROT SERPL-MCNC: 6.3 G/DL (ref 6–8.5)
SODIUM SERPL-SCNC: 139 MMOL/L (ref 136–145)
T4 FREE SERPL-MCNC: 1.32 NG/DL (ref 0.93–1.7)
TRIGL SERPL-MCNC: 63 MG/DL (ref 0–150)
TSH SERPL DL<=0.005 MIU/L-ACNC: 1.51 UIU/ML (ref 0.27–4.2)
VLDLC SERPL CALC-MCNC: 12.6 MG/DL

## 2020-01-11 NOTE — ASSESSMENT & PLAN NOTE
Discussed severe deficiency with vitamin D level 11 at last check   Recommended taking weekly supplement

## 2020-02-04 RX ORDER — LEVOTHYROXINE SODIUM 0.05 MG/1
TABLET ORAL
Qty: 90 TABLET | Refills: 3 | Status: SHIPPED | OUTPATIENT
Start: 2020-02-04 | End: 2021-01-25

## 2020-02-12 RX ORDER — AMMONIUM LACTATE 12 G/100G
LOTION TOPICAL
Qty: 225 EACH | Refills: 1 | Status: SHIPPED | OUTPATIENT
Start: 2020-02-12 | End: 2021-03-25

## 2020-08-25 RX ORDER — ERGOCALCIFEROL 1.25 MG/1
CAPSULE ORAL
Qty: 5 CAPSULE | Refills: 5 | Status: SHIPPED | OUTPATIENT
Start: 2020-08-25 | End: 2021-01-14 | Stop reason: SDUPTHER

## 2021-01-11 ENCOUNTER — OFFICE VISIT (OUTPATIENT)
Dept: ENDOCRINOLOGY | Facility: CLINIC | Age: 36
End: 2021-01-11

## 2021-01-11 VITALS
OXYGEN SATURATION: 98 % | HEART RATE: 82 BPM | SYSTOLIC BLOOD PRESSURE: 138 MMHG | BODY MASS INDEX: 44.05 KG/M2 | WEIGHT: 248.6 LBS | DIASTOLIC BLOOD PRESSURE: 64 MMHG | HEIGHT: 63 IN

## 2021-01-11 DIAGNOSIS — E78.1 PRIMARY HYPERTRIGLYCERIDEMIA: ICD-10-CM

## 2021-01-11 DIAGNOSIS — E55.9 VITAMIN D DEFICIENCY: Primary | ICD-10-CM

## 2021-01-11 DIAGNOSIS — E03.9 HYPOTHYROIDISM, UNSPECIFIED TYPE: ICD-10-CM

## 2021-01-11 PROCEDURE — 99213 OFFICE O/P EST LOW 20 MIN: CPT | Performed by: INTERNAL MEDICINE

## 2021-01-11 NOTE — PROGRESS NOTES
Marly Snyder 36 y.o.  CC: Follow-up, Hypothyroidism, Hyperlipidemia, and Vitamin D Deficiency      [place]: Follow-up, Hypothyroidism, Hyperlipidemia, and Vitamin D Deficiency    bp is good   Is on synthroid 50 mcg daily   Is on vitamin D supplement   Weight gain, working from home and is less active and more snacking     Allergies   Allergen Reactions   • Morphine Anaphylaxis   • Sulfa Antibiotics Rash       Current Outpatient Medications:   •  ammonium lactate (AMLACTIN) 12 % cream, Apply  topically As Needed for Dry Skin., Disp: , Rfl:   •  ammonium lactate (LAC-HYDRIN) 12 % lotion, APPLY TO AFFECTED AREA(S) TWO TIMES A DAY, Disp: 225 each, Rfl: 1  •  levonorgestrel (KYLEENA) 19.5 MG intrauterine device IUD, 1 each by Intrauterine route 1 (One) Time., Disp: , Rfl:   •  levothyroxine (SYNTHROID, LEVOTHROID) 50 MCG tablet, TAKE ONE TABLET BY MOUTH DAILY, Disp: 90 tablet, Rfl: 3  •  spironolactone (ALDACTONE) 50 MG tablet, , Disp: , Rfl:   •  vitamin D (ERGOCALCIFEROL) 1.25 MG (39069 UT) capsule capsule, TAKE 1 (ONE) CAPSULE BY MOUTH ONCE WEEKLY, Disp: 5 capsule, Rfl: 5  Patient Active Problem List    Diagnosis   • Primary hypertriglyceridemia [E78.1]   • Hyperlipidemia [E78.5]   • Hypothyroidism [E03.9]   • Vitamin D deficiency [E55.9]     Review of Systems   Constitutional: Positive for unexpected weight change. Negative for activity change and appetite change.   HENT: Negative for congestion and rhinorrhea.    Eyes: Negative for visual disturbance.   Respiratory: Negative for cough and wheezing.    Cardiovascular: Negative for chest pain and palpitations.   Gastrointestinal: Negative for constipation and diarrhea.   Musculoskeletal: Negative for arthralgias and myalgias.   Skin: Negative for rash.   Neurological: Negative for dizziness and light-headedness.   Psychiatric/Behavioral: Negative for dysphoric mood. The patient is not nervous/anxious.      Social History     Socioeconomic History   • Marital status:  "Single     Spouse name: Not on file   • Number of children: Not on file   • Years of education: Not on file   • Highest education level: Not on file   Tobacco Use   • Smoking status: Never Smoker   • Smokeless tobacco: Never Used   Substance and Sexual Activity   • Alcohol use: Yes     Comment: occasionally   • Drug use: No   • Sexual activity: Defer     Family History   Problem Relation Age of Onset   • Hypertension Father    • Hyperlipidemia Father    • Coronary artery disease Father    • Cancer Paternal Grandfather    • Breast cancer Other      /64   Pulse 82   Ht 160 cm (63\")   Wt 113 kg (248 lb 9.6 oz)   SpO2 98%   BMI 44.04 kg/m²   Physical Exam  Constitutional:       Appearance: Normal appearance.   HENT:      Head: Normocephalic and atraumatic.   Eyes:      Extraocular Movements: Extraocular movements intact.      Pupils: Pupils are equal, round, and reactive to light.   Neck:      Musculoskeletal: Normal range of motion and neck supple.      Thyroid: No thyroid mass, thyromegaly or thyroid tenderness.   Cardiovascular:      Rate and Rhythm: Normal rate and regular rhythm.      Pulses: Normal pulses.      Heart sounds: No murmur.   Pulmonary:      Effort: Pulmonary effort is normal. No respiratory distress.      Breath sounds: Normal breath sounds. No wheezing.   Musculoskeletal: Normal range of motion.         General: No swelling or tenderness.   Skin:     General: Skin is warm and dry.   Neurological:      General: No focal deficit present.      Mental Status: She is alert and oriented to person, place, and time.   Psychiatric:         Mood and Affect: Mood normal.         Behavior: Behavior normal.         Thought Content: Thought content normal.       Results for orders placed or performed in visit on 01/10/20   Comprehensive Metabolic Panel    BLOOD   Result Value Ref Range    Glucose 86 65 - 99 mg/dL    BUN 12 6 - 20 mg/dL    Creatinine 0.69 0.57 - 1.00 mg/dL    eGFR Non African Am 99 >60 " mL/min/1.73    eGFR African Am 119 >60 mL/min/1.73    BUN/Creatinine Ratio 17.4 7.0 - 25.0    Sodium 139 136 - 145 mmol/L    Potassium 4.3 3.5 - 5.2 mmol/L    Chloride 101 98 - 107 mmol/L    Total CO2 25.4 22.0 - 29.0 mmol/L    Calcium 9.0 8.6 - 10.5 mg/dL    Total Protein 6.3 6.0 - 8.5 g/dL    Albumin 4.20 3.50 - 5.20 g/dL    Globulin 2.1 gm/dL    A/G Ratio 2.0 g/dL    Total Bilirubin 0.3 0.2 - 1.2 mg/dL    Alkaline Phosphatase 54 39 - 117 U/L    AST (SGOT) 13 1 - 32 U/L    ALT (SGPT) 12 1 - 33 U/L   Lipid Panel    BLOOD   Result Value Ref Range    Total Cholesterol 191 0 - 200 mg/dL    Triglycerides 63 0 - 150 mg/dL    HDL Cholesterol 64 (H) 40 - 60 mg/dL    VLDL Cholesterol 12.6 mg/dL    LDL Cholesterol  114 (H) 0 - 100 mg/dL   T4, Free    BLOOD   Result Value Ref Range    Free T4 1.32 0.93 - 1.70 ng/dL   TSH    BLOOD   Result Value Ref Range    TSH 1.510 0.270 - 4.200 uIU/mL   Vitamin D 25 Hydroxy    BLOOD   Result Value Ref Range    25 Hydroxy, Vitamin D 19.1 (L) 30.0 - 100.0 ng/ml     Problems Addressed this Visit        Other    Vitamin D deficiency - Primary     Is on vitamin D supplement- update levels          Relevant Orders    Vitamin D 25 Hydroxy    Primary hypertriglyceridemia     Update flp   Consider check a1c if persistent elevation          Relevant Orders    Lipid Panel    Hypothyroidism     Check cmp and tfts   Energy good overall  Refill synthroid          Relevant Orders    Comprehensive Metabolic Panel    TSH    T4, Free      Diagnoses       Codes Comments    Vitamin D deficiency    -  Primary ICD-10-CM: E55.9  ICD-9-CM: 268.9     Primary hypertriglyceridemia     ICD-10-CM: E78.1  ICD-9-CM: 272.1     Hypothyroidism, unspecified type     ICD-10-CM: E03.9  ICD-9-CM: 244.9         Return in about 1 year (around 1/11/2022) for Recheck.    Vi Farfan MD  Signed Vi Farfan MD

## 2021-01-12 LAB
25(OH)D3+25(OH)D2 SERPL-MCNC: 17.7 NG/ML (ref 30–100)
ALBUMIN SERPL-MCNC: 4.2 G/DL (ref 3.8–4.8)
ALBUMIN/GLOB SERPL: 2 {RATIO} (ref 1.2–2.2)
ALP SERPL-CCNC: 69 IU/L (ref 39–117)
ALT SERPL-CCNC: 20 IU/L (ref 0–32)
AST SERPL-CCNC: 13 IU/L (ref 0–40)
BILIRUB SERPL-MCNC: 0.4 MG/DL (ref 0–1.2)
BUN SERPL-MCNC: 10 MG/DL (ref 6–20)
BUN/CREAT SERPL: 13 (ref 9–23)
CALCIUM SERPL-MCNC: 9.4 MG/DL (ref 8.7–10.2)
CHLORIDE SERPL-SCNC: 105 MMOL/L (ref 96–106)
CHOLEST SERPL-MCNC: 200 MG/DL (ref 100–199)
CO2 SERPL-SCNC: 21 MMOL/L (ref 20–29)
CREAT SERPL-MCNC: 0.77 MG/DL (ref 0.57–1)
GLOBULIN SER CALC-MCNC: 2.1 G/DL (ref 1.5–4.5)
GLUCOSE SERPL-MCNC: 94 MG/DL (ref 65–99)
HDLC SERPL-MCNC: 50 MG/DL
LDLC SERPL CALC-MCNC: 131 MG/DL (ref 0–99)
POTASSIUM SERPL-SCNC: 4.4 MMOL/L (ref 3.5–5.2)
PROT SERPL-MCNC: 6.3 G/DL (ref 6–8.5)
SODIUM SERPL-SCNC: 140 MMOL/L (ref 134–144)
T4 FREE SERPL-MCNC: 1.41 NG/DL (ref 0.82–1.77)
TRIGL SERPL-MCNC: 107 MG/DL (ref 0–149)
TSH SERPL DL<=0.005 MIU/L-ACNC: 1.65 UIU/ML (ref 0.45–4.5)
VLDLC SERPL CALC-MCNC: 19 MG/DL (ref 5–40)

## 2021-01-14 RX ORDER — ERGOCALCIFEROL 1.25 MG/1
50000 CAPSULE ORAL 2 TIMES WEEKLY
Qty: 10 CAPSULE | Refills: 5 | Status: SHIPPED | OUTPATIENT
Start: 2021-01-14 | End: 2021-10-05

## 2021-01-25 RX ORDER — LEVOTHYROXINE SODIUM 0.05 MG/1
TABLET ORAL
Qty: 90 TABLET | Refills: 3 | Status: SHIPPED | OUTPATIENT
Start: 2021-01-25 | End: 2022-01-10 | Stop reason: SDUPTHER

## 2021-01-25 NOTE — TELEPHONE ENCOUNTER
Dr. Farfan, refill request.  Thank you.    LOV:  01/11/21  Future visit:  01/10/22    Last refill:  02/04/20  Q.  90  R.  3  Sig:  TAKE ONE TABLET BY MOUTH DAILY    Sarkis

## 2021-03-25 RX ORDER — AMMONIUM LACTATE 12 G/100G
LOTION TOPICAL
Qty: 225 G | Refills: 1 | Status: SHIPPED | OUTPATIENT
Start: 2021-03-25 | End: 2022-05-18

## 2021-03-25 NOTE — TELEPHONE ENCOUNTER
Refill request.    LOV:  01/11/21  Future visit:  01/10/22    Last refill:  02/12/20  Q.  225 each  R.  1  Sig:  APPLY TO AFFECTED AREA(S) TWO TIMES A DAY    Sarkis

## 2021-10-05 RX ORDER — ERGOCALCIFEROL 1.25 MG/1
CAPSULE ORAL
Qty: 12 CAPSULE | Refills: 1 | Status: SHIPPED | OUTPATIENT
Start: 2021-10-05 | End: 2021-12-30

## 2021-10-05 NOTE — TELEPHONE ENCOUNTER
Discussed with daughter and patient.   Her memory difficulties are most likely a combination of residual effect from the stroke , primarily related to her aphasia and are most likely vascular in origin.  Continue Namenda 10 mg daily in the morning.  She has been doing well with no significant progression and has been speaking a little better. She now ambulates without assistance. Control of vascular risk factors.  Advised reading aloud and writing exercises that would overall her language problems.  She can do this with her grandkids.     LOV 1-11-21  NOV 1-10-22

## 2021-12-30 RX ORDER — ERGOCALCIFEROL 1.25 MG/1
CAPSULE ORAL
Qty: 12 CAPSULE | Refills: 0 | Status: SHIPPED | OUTPATIENT
Start: 2021-12-30 | End: 2022-01-10 | Stop reason: SDUPTHER

## 2022-01-10 ENCOUNTER — OFFICE VISIT (OUTPATIENT)
Dept: ENDOCRINOLOGY | Facility: CLINIC | Age: 37
End: 2022-01-10

## 2022-01-10 ENCOUNTER — LAB (OUTPATIENT)
Dept: LAB | Facility: HOSPITAL | Age: 37
End: 2022-01-10

## 2022-01-10 VITALS
DIASTOLIC BLOOD PRESSURE: 78 MMHG | OXYGEN SATURATION: 98 % | HEART RATE: 78 BPM | HEIGHT: 64 IN | SYSTOLIC BLOOD PRESSURE: 118 MMHG | WEIGHT: 248.8 LBS | BODY MASS INDEX: 42.47 KG/M2

## 2022-01-10 DIAGNOSIS — E55.9 VITAMIN D DEFICIENCY: Primary | ICD-10-CM

## 2022-01-10 DIAGNOSIS — E78.1 PRIMARY HYPERTRIGLYCERIDEMIA: ICD-10-CM

## 2022-01-10 DIAGNOSIS — E03.9 HYPOTHYROIDISM, UNSPECIFIED TYPE: ICD-10-CM

## 2022-01-10 PROCEDURE — 99214 OFFICE O/P EST MOD 30 MIN: CPT | Performed by: INTERNAL MEDICINE

## 2022-01-10 RX ORDER — LEVOTHYROXINE SODIUM 0.05 MG/1
50 TABLET ORAL DAILY
Qty: 90 TABLET | Refills: 3 | Status: SHIPPED | OUTPATIENT
Start: 2022-01-10 | End: 2023-01-16 | Stop reason: SDUPTHER

## 2022-01-10 RX ORDER — ERGOCALCIFEROL 1.25 MG/1
50000 CAPSULE ORAL 2 TIMES WEEKLY
Qty: 25 CAPSULE | Refills: 3 | Status: SHIPPED | OUTPATIENT
Start: 2022-01-10 | End: 2023-03-01

## 2022-01-10 NOTE — PROGRESS NOTES
Marly Snyder 36 y.o.  [facility]:Follow-up, Hypothyroidism, Hyperlipidemia, and Vitamin D Deficiency      [place]: Follow-up, Hypothyroidism, Hyperlipidemia, and Vitamin D Deficiency    Taking synthroid 50 mcg daily   bp is good  Is taking vitamin D supplement  Is eating low fat diet    Allergies   Allergen Reactions   • Morphine Anaphylaxis   • Sulfa Antibiotics Rash       Current Outpatient Medications:   •  ammonium lactate (AMLACTIN) 12 % cream, Apply  topically As Needed for Dry Skin., Disp: , Rfl:   •  ammonium lactate (LAC-HYDRIN) 12 % lotion, APPLY TO AFFECTED AREA(S) TWO TIMES A DAY, Disp: 225 g, Rfl: 1  •  levonorgestrel (KYLEENA) 19.5 MG intrauterine device IUD, 1 each by Intrauterine route 1 (One) Time., Disp: , Rfl:   •  levothyroxine (SYNTHROID, LEVOTHROID) 50 MCG tablet, Take 1 tablet by mouth Daily., Disp: 90 tablet, Rfl: 3  •  spironolactone (ALDACTONE) 50 MG tablet, , Disp: , Rfl:   •  vitamin D (ERGOCALCIFEROL) 1.25 MG (05590 UT) capsule capsule, Take 1 capsule by mouth 2 (Two) Times a Week., Disp: 25 capsule, Rfl: 3  Patient Active Problem List    Diagnosis    • Primary hypertriglyceridemia [E78.1]    • Hyperlipidemia [E78.5]    • Hypothyroidism [E03.9]    • Vitamin D deficiency [E55.9]      Review of Systems   Constitutional: Negative for activity change, appetite change and unexpected weight change.   HENT: Negative for congestion and rhinorrhea.    Eyes: Negative for visual disturbance.   Respiratory: Negative for cough and shortness of breath.    Cardiovascular: Negative for palpitations and leg swelling.   Gastrointestinal: Negative for constipation, diarrhea and nausea.   Genitourinary: Negative for hematuria.   Musculoskeletal: Negative for arthralgias, back pain, gait problem, joint swelling and myalgias.   Skin: Negative for color change, rash and wound.   Allergic/Immunologic: Negative for environmental allergies, food allergies and immunocompromised state.   Neurological: Negative for dizziness,  "weakness and light-headedness.   Psychiatric/Behavioral: Negative for confusion, decreased concentration, dysphoric mood and sleep disturbance. The patient is not nervous/anxious.      Social History     Socioeconomic History   • Marital status: Single   Tobacco Use   • Smoking status: Never Smoker   • Smokeless tobacco: Never Used   Substance and Sexual Activity   • Alcohol use: Yes     Alcohol/week: 0.0 standard drinks     Comment: 1 - 3 drinks/week   • Drug use: No   • Sexual activity: Not Currently     Partners: Male     Birth control/protection: Condom, I.U.D.     Comment: Kyleena Insertion: 12/21/18     Family History   Problem Relation Age of Onset   • Hypertension Father    • Hyperlipidemia Father    • Coronary artery disease Father    • Cancer Paternal Grandfather         lung   • Breast cancer Other    • Cancer Paternal Aunt         Great Aunt - Bone     /78   Pulse 78   Ht 161.3 cm (63.5\")   Wt 113 kg (248 lb 12.8 oz)   SpO2 98%   BMI 43.38 kg/m²   Physical Exam  Vitals and nursing note reviewed.   Constitutional:       Appearance: Normal appearance. She is well-developed.   HENT:      Head: Normocephalic and atraumatic.   Eyes:      General: Lids are normal.      Extraocular Movements: Extraocular movements intact.      Conjunctiva/sclera: Conjunctivae normal.      Pupils: Pupils are equal, round, and reactive to light.   Neck:      Thyroid: No thyroid mass or thyromegaly.      Vascular: No carotid bruit.      Trachea: Trachea normal. No tracheal deviation.   Cardiovascular:      Rate and Rhythm: Normal rate and regular rhythm.      Pulses: Normal pulses.      Heart sounds: Normal heart sounds. No murmur heard.  No friction rub. No gallop.    Pulmonary:      Effort: Pulmonary effort is normal. No respiratory distress.      Breath sounds: Normal breath sounds. No wheezing.   Musculoskeletal:         General: No deformity. Normal range of motion.      Cervical back: Normal range of motion and " neck supple.   Lymphadenopathy:      Cervical: No cervical adenopathy.   Skin:     General: Skin is warm and dry.      Findings: No erythema or rash.      Nails: There is no clubbing.   Neurological:      General: No focal deficit present.      Mental Status: She is alert and oriented to person, place, and time.      Cranial Nerves: No cranial nerve deficit.      Deep Tendon Reflexes: Reflexes are normal and symmetric. Reflexes normal.   Psychiatric:         Mood and Affect: Mood normal.         Speech: Speech normal.         Behavior: Behavior normal.         Thought Content: Thought content normal.         Judgment: Judgment normal.       Results for orders placed or performed in visit on 01/11/21   Comprehensive Metabolic Panel    Specimen: Blood    BLOOD   Result Value Ref Range    Glucose 94 65 - 99 mg/dL    BUN 10 6 - 20 mg/dL    Creatinine 0.77 0.57 - 1.00 mg/dL    eGFR Non African Am 101 >59 mL/min/1.73    eGFR African Am 117 >59 mL/min/1.73    BUN/Creatinine Ratio 13 9 - 23    Sodium 140 134 - 144 mmol/L    Potassium 4.4 3.5 - 5.2 mmol/L    Chloride 105 96 - 106 mmol/L    Total CO2 21 20 - 29 mmol/L    Calcium 9.4 8.7 - 10.2 mg/dL    Total Protein 6.3 6.0 - 8.5 g/dL    Albumin 4.2 3.8 - 4.8 g/dL    Globulin 2.1 1.5 - 4.5 g/dL    A/G Ratio 2.0 1.2 - 2.2    Total Bilirubin 0.4 0.0 - 1.2 mg/dL    Alkaline Phosphatase 69 39 - 117 IU/L    AST (SGOT) 13 0 - 40 IU/L    ALT (SGPT) 20 0 - 32 IU/L   Lipid Panel    Specimen: Blood    BLOOD   Result Value Ref Range    Total Cholesterol 200 (H) 100 - 199 mg/dL    Triglycerides 107 0 - 149 mg/dL    HDL Cholesterol 50 >39 mg/dL    VLDL Cholesterol [place] 19 5 - 40 mg/dL    LDL Chol Calc (NIH) 131 (H) 0 - 99 mg/dL   TSH    Specimen: Blood    BLOOD   Result Value Ref Range    TSH 1.650 0.450 - 4.500 uIU/mL   T4, Free    Specimen: Blood    BLOOD   Result Value Ref Range    Free T4 1.41 0.82 - 1.77 ng/dL   Vitamin D 25 Hydroxy    Specimen: Blood    BLOOD   Result Value Ref Range     25 Hydroxy, Vitamin D 17.7 (L) 30.0 - 100.0 ng/mL     Diagnoses and all orders for this visit:    1. Vitamin D deficiency (Primary)  Assessment & Plan:  Continue supplement, update levels     Orders:  -     Vitamin D 25 Hydroxy    2. Primary hypertriglyceridemia  Assessment & Plan:  Update lipid panel  Continue low fat diet    Orders:  -     Comprehensive Metabolic Panel  -     Lipid Panel    3. Hypothyroidism, unspecified type  Assessment & Plan:  Is on synthroid 50 mcg daily   Check tfts    Orders:  -     TSH  -     T4, Free    Other orders  -     levothyroxine (SYNTHROID, LEVOTHROID) 50 MCG tablet; Take 1 tablet by mouth Daily.  Dispense: 90 tablet; Refill: 3  -     vitamin D (ERGOCALCIFEROL) 1.25 MG (07237 UT) capsule capsule; Take 1 capsule by mouth 2 (Two) Times a Week.  Dispense: 25 capsule; Refill: 3  Return in about 1 year (around 1/10/2023) for Recheck.    Vi Farfan MD  Signed Vi Farfan MD

## 2022-01-11 LAB
25(OH)D3+25(OH)D2 SERPL-MCNC: 33.2 NG/ML (ref 30–100)
ALBUMIN SERPL-MCNC: 4.2 G/DL (ref 3.8–4.8)
ALBUMIN/GLOB SERPL: 1.9 {RATIO} (ref 1.2–2.2)
ALP SERPL-CCNC: 75 IU/L (ref 44–121)
ALT SERPL-CCNC: 13 IU/L (ref 0–32)
AST SERPL-CCNC: 12 IU/L (ref 0–40)
BILIRUB SERPL-MCNC: 0.6 MG/DL (ref 0–1.2)
BUN SERPL-MCNC: 12 MG/DL (ref 6–20)
BUN/CREAT SERPL: 16 (ref 9–23)
CALCIUM SERPL-MCNC: 9.4 MG/DL (ref 8.7–10.2)
CHLORIDE SERPL-SCNC: 102 MMOL/L (ref 96–106)
CHOLEST SERPL-MCNC: 220 MG/DL (ref 100–199)
CO2 SERPL-SCNC: 22 MMOL/L (ref 20–29)
CREAT SERPL-MCNC: 0.76 MG/DL (ref 0.57–1)
GLOBULIN SER CALC-MCNC: 2.2 G/DL (ref 1.5–4.5)
GLUCOSE SERPL-MCNC: 91 MG/DL (ref 65–99)
HDLC SERPL-MCNC: 49 MG/DL
LDLC SERPL CALC-MCNC: 138 MG/DL (ref 0–99)
POTASSIUM SERPL-SCNC: 4.1 MMOL/L (ref 3.5–5.2)
PROT SERPL-MCNC: 6.4 G/DL (ref 6–8.5)
SODIUM SERPL-SCNC: 139 MMOL/L (ref 134–144)
T4 FREE SERPL-MCNC: 1.42 NG/DL (ref 0.82–1.77)
TRIGL SERPL-MCNC: 183 MG/DL (ref 0–149)
TSH SERPL DL<=0.005 MIU/L-ACNC: 2.06 UIU/ML (ref 0.45–4.5)
VLDLC SERPL CALC-MCNC: 33 MG/DL (ref 5–40)

## 2022-05-18 RX ORDER — AMMONIUM LACTATE 12 G/100G
LOTION TOPICAL
Qty: 1 EACH | Refills: 1 | Status: SHIPPED | OUTPATIENT
Start: 2022-05-18 | End: 2023-01-16 | Stop reason: SDUPTHER

## 2022-05-20 ENCOUNTER — PRIOR AUTHORIZATION (OUTPATIENT)
Dept: ENDOCRINOLOGY | Facility: CLINIC | Age: 37
End: 2022-05-20

## 2022-05-20 PROBLEM — L85.3 XEROSIS CUTIS: Status: ACTIVE | Noted: 2022-05-20

## 2023-01-16 ENCOUNTER — OFFICE VISIT (OUTPATIENT)
Dept: ENDOCRINOLOGY | Facility: CLINIC | Age: 38
End: 2023-01-16
Payer: COMMERCIAL

## 2023-01-16 VITALS
OXYGEN SATURATION: 98 % | HEIGHT: 63 IN | WEIGHT: 259 LBS | BODY MASS INDEX: 45.89 KG/M2 | HEART RATE: 83 BPM | SYSTOLIC BLOOD PRESSURE: 114 MMHG | DIASTOLIC BLOOD PRESSURE: 72 MMHG

## 2023-01-16 DIAGNOSIS — E55.9 VITAMIN D DEFICIENCY: ICD-10-CM

## 2023-01-16 DIAGNOSIS — E78.2 MIXED HYPERLIPIDEMIA: Primary | ICD-10-CM

## 2023-01-16 DIAGNOSIS — E03.9 HYPOTHYROIDISM, UNSPECIFIED TYPE: ICD-10-CM

## 2023-01-16 LAB
BASOPHILS # BLD AUTO: 0.02 10*3/MM3 (ref 0–0.2)
BASOPHILS NFR BLD AUTO: 0.3 % (ref 0–1.5)
DEPRECATED RDW RBC AUTO: 38.1 FL (ref 37–54)
EOSINOPHIL # BLD AUTO: 0.13 10*3/MM3 (ref 0–0.4)
EOSINOPHIL NFR BLD AUTO: 1.7 % (ref 0.3–6.2)
ERYTHROCYTE [DISTWIDTH] IN BLOOD BY AUTOMATED COUNT: 11.5 % (ref 12.3–15.4)
HCT VFR BLD AUTO: 40.9 % (ref 34–46.6)
HGB BLD-MCNC: 14.4 G/DL (ref 12–15.9)
IMM GRANULOCYTES # BLD AUTO: 0.02 10*3/MM3 (ref 0–0.05)
IMM GRANULOCYTES NFR BLD AUTO: 0.3 % (ref 0–0.5)
LYMPHOCYTES # BLD AUTO: 1.93 10*3/MM3 (ref 0.7–3.1)
LYMPHOCYTES NFR BLD AUTO: 25.8 % (ref 19.6–45.3)
MCH RBC QN AUTO: 32 PG (ref 26.6–33)
MCHC RBC AUTO-ENTMCNC: 35.2 G/DL (ref 31.5–35.7)
MCV RBC AUTO: 90.9 FL (ref 79–97)
MONOCYTES # BLD AUTO: 0.49 10*3/MM3 (ref 0.1–0.9)
MONOCYTES NFR BLD AUTO: 6.5 % (ref 5–12)
NEUTROPHILS NFR BLD AUTO: 4.9 10*3/MM3 (ref 1.7–7)
NEUTROPHILS NFR BLD AUTO: 65.4 % (ref 42.7–76)
NRBC BLD AUTO-RTO: 0 /100 WBC (ref 0–0.2)
PLATELET # BLD AUTO: 327 10*3/MM3 (ref 140–450)
PMV BLD AUTO: 10.2 FL (ref 6–12)
RBC # BLD AUTO: 4.5 10*6/MM3 (ref 3.77–5.28)
WBC NRBC COR # BLD: 7.49 10*3/MM3 (ref 3.4–10.8)

## 2023-01-16 PROCEDURE — 82306 VITAMIN D 25 HYDROXY: CPT | Performed by: INTERNAL MEDICINE

## 2023-01-16 PROCEDURE — 85025 COMPLETE CBC W/AUTO DIFF WBC: CPT | Performed by: INTERNAL MEDICINE

## 2023-01-16 PROCEDURE — 84439 ASSAY OF FREE THYROXINE: CPT | Performed by: INTERNAL MEDICINE

## 2023-01-16 PROCEDURE — 99214 OFFICE O/P EST MOD 30 MIN: CPT | Performed by: INTERNAL MEDICINE

## 2023-01-16 PROCEDURE — 84443 ASSAY THYROID STIM HORMONE: CPT | Performed by: INTERNAL MEDICINE

## 2023-01-16 PROCEDURE — 83036 HEMOGLOBIN GLYCOSYLATED A1C: CPT | Performed by: INTERNAL MEDICINE

## 2023-01-16 PROCEDURE — 80053 COMPREHEN METABOLIC PANEL: CPT | Performed by: INTERNAL MEDICINE

## 2023-01-16 PROCEDURE — 80061 LIPID PANEL: CPT | Performed by: INTERNAL MEDICINE

## 2023-01-16 RX ORDER — AMMONIUM LACTATE 12 G/100G
LOTION TOPICAL 2 TIMES DAILY
Qty: 225 G | Refills: 1 | Status: SHIPPED | OUTPATIENT
Start: 2023-01-16

## 2023-01-16 RX ORDER — LEVOTHYROXINE SODIUM 0.05 MG/1
50 TABLET ORAL DAILY
Qty: 90 TABLET | Refills: 1 | Status: SHIPPED | OUTPATIENT
Start: 2023-01-16

## 2023-01-16 NOTE — PROGRESS NOTES
Marly Snyder 38 y.o.  CC:Follow-up, Hypothyroidism, Hyperlipidemia, and Vitamin D Deficiency    [place]: Follow-up, Hypothyroidism, Hyperlipidemia, and Vitamin D Deficiency    Is taking synthroid 50 mcg daily   No menses- iud  Is taking spironolactone 100 mg daily   Is taking vitamin D supplement    Allergies   Allergen Reactions   • Morphine Anaphylaxis   • Sulfa Antibiotics Rash       Current Outpatient Medications:   •  ammonium lactate (LAC-HYDRIN) 12 % lotion, Apply  topically to the appropriate area as directed 2 (Two) Times a Day. 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   HENT: Negative for congestion and rhinorrhea.    Eyes: Positive for redness. Negative for visual disturbance.   Respiratory: Negative for cough and shortness of breath.    Cardiovascular: Negative for palpitations and leg swelling.   Gastrointestinal: Negative for constipation, diarrhea and nausea.   Genitourinary: Negative for hematuria.   Musculoskeletal: Negative for arthralgias, back pain, gait problem, joint swelling and myalgias.   Skin: Positive for rash. Negative for color change and wound.   Allergic/Immunologic: Negative for environmental allergies, food allergies and  "immunocompromised state.   Neurological: Negative for dizziness, weakness and light-headedness.   Psychiatric/Behavioral: Negative for confusion, decreased concentration, dysphoric mood and sleep disturbance. The patient is not nervous/anxious.      Social History     Socioeconomic History   • Marital status: Single   Tobacco Use   • Smoking status: Never   • Smokeless tobacco: Never   Substance and Sexual Activity   • Alcohol use: Yes     Alcohol/week: 3.0 standard drinks     Types: 2 Glasses of wine, 1 Drinks containing 0.5 oz of alcohol per week     Comment: 1 - 3 drinks/week   • Drug use: No   • Sexual activity: Not Currently     Partners: Male     Birth control/protection: Condom, I.U.D.     Comment: Kyleena Insertion: 12/21/18     Family History   Problem Relation Age of Onset   • Hypertension Father    • Hyperlipidemia Father    • Coronary artery disease Father    • Cancer Paternal Grandfather         lung   • Breast cancer Other    • Cancer Paternal Aunt         Great Aunt - Bone     /72   Pulse 83   Ht 160 cm (63\")   Wt 117 kg (259 lb)   SpO2 98%   BMI 45.88 kg/m²   Physical Exam  Vitals and nursing note reviewed.   Constitutional:       Appearance: Normal appearance. She is well-developed.   HENT:      Head: Normocephalic and atraumatic.   Eyes:      General: Lids are normal.      Extraocular Movements: Extraocular movements intact.      Conjunctiva/sclera: Conjunctivae normal.      Pupils: Pupils are equal, round, and reactive to light.   Neck:      Thyroid: No thyroid mass or thyromegaly.      Vascular: No carotid bruit.      Trachea: Trachea normal. No tracheal deviation.   Cardiovascular:      Rate and Rhythm: Normal rate and regular rhythm.      Pulses: Normal pulses.      Heart sounds: Normal heart sounds. No murmur heard.    No friction rub. No gallop.   Pulmonary:      Effort: Pulmonary effort is normal. No respiratory distress.      Breath sounds: Normal breath sounds. No wheezing. "   Musculoskeletal:         General: No deformity. Normal range of motion.      Cervical back: Normal range of motion and neck supple.   Lymphadenopathy:      Cervical: No cervical adenopathy.   Skin:     General: Skin is warm and dry.      Findings: No erythema or rash.      Nails: There is no clubbing.      Comments: Smooth hyperpigmented lesions bilateral legs  Cherry angioma chest    Neurological:      General: No focal deficit present.      Mental Status: She is alert and oriented to person, place, and time.      Cranial Nerves: No cranial nerve deficit.      Deep Tendon Reflexes: Reflexes are normal and symmetric. Reflexes normal.   Psychiatric:         Mood and Affect: Mood normal.         Speech: Speech normal.         Behavior: Behavior normal.         Thought Content: Thought content normal.         Judgment: Judgment normal.       Results for orders placed or performed in visit on 01/10/22   Comprehensive Metabolic Panel    Specimen: Blood    Blood  Release to [facility]   Result Value Ref Range    Glucose 91 65 - 99 mg/dL    BUN 12 6 - 20 mg/dL    Creatinine 0.76 0.57 - 1.00 mg/dL    eGFR Non African Am 103 >59 mL/min/1.73    eGFR African Am 118 >59 mL/min/1.73    BUN/Creatinine Ratio 16 9 - 23    Sodium 139 134 - 144 mmol/L    Potassium 4.1 3.5 - 5.2 mmol/L    Chloride 102 96 - 106 mmol/L    Total CO2 22 20 - 29 mmol/L    Calcium 9.4 8.7 - 10.2 mg/dL    Total Protein 6.4 6.0 - 8.5 g/dL    Albumin 4.2 3.8 - 4.8 g/dL    Globulin 2.2 1.5 - 4.5 g/dL    A/G Ratio 1.9 1.2 - 2.2    Total Bilirubin 0.6 0.0 - 1.2 mg/dL    Alkaline Phosphatase 75 44 - 121 IU/L    AST (SGOT) 12 0 - 40 IU/L    ALT (SGPT) 13 0 - 32 IU/L   Lipid Panel    Specimen: Blood    Blood  Release to [facility]   Result Value Ref Range    Total Cholesterol 220 (H) 100 - 199 mg/dL    Triglycerides 183 (H) 0 - 149 mg/dL    HDL Cholesterol 49 >39 mg/dL    VLDL Cholesterol Lester 33 5 - 40 mg/dL    LDL Chol Calc (NIH) 138 (H) 0 - 99 mg/dL   TSH    Specimen: Blood     Blood  Release to [facility]   Result Value Ref Range    TSH 2.060 0.450 - 4.500 uIU/mL   T4, Free    Specimen: Blood    Blood  Release to [facility]   Result Value Ref Range    Free T4 1.42 0.82 - 1.77 ng/dL   Vitamin D 25 Hydroxy    Specimen: Blood    Blood  Release to [facility]   Result Value Ref Range    25 Hydroxy, Vitamin D 33.2 30.0 - 100.0 ng/mL     Diagnoses and all orders for this visit:    1. Mixed hyperlipidemia (Primary)  Assessment & Plan:  Continue low fat diet  Has rash legs c/w eruptive xanthomas- check flp and add fibrate     Orders:  -     Comprehensive Metabolic Panel; Future  -     Lipid Panel; Future  -     Hemoglobin A1c; Future  -     Comprehensive Metabolic Panel  -     Lipid Panel  -     Hemoglobin A1c    2. Hypothyroidism, unspecified type  Assessment & Plan:  Taking synthroid 50 mcg daily   Check tfts     Orders:  -     CBC Auto Differential; Future  -     TSH; Future  -     T4, Free; Future  -     CBC Auto Differential  -     TSH  -     T4, Free    3. Vitamin D deficiency  Assessment & Plan:  Continue supplement   Check levels     Orders:  -     Vitamin D,25-Hydroxy; Future  -     Vitamin D,25-Hydroxy    Other orders  -     levothyroxine (SYNTHROID, LEVOTHROID) 50 MCG tablet; Take 1 tablet by mouth Daily.  Dispense: 90 tablet; Refill: 1  -     ammonium lactate (LAC-HYDRIN) 12 % lotion; Apply  topically to the appropriate area as directed 2 (Two) Times a Day. to affected area(s)  Dispense: 225 g; Refill: 1  Return in about 1 year (around 1/16/2024).    Vi Farfan MD  Signed Vi Farfan MD    ]

## 2023-01-17 LAB
25(OH)D3 SERPL-MCNC: 36.8 NG/ML (ref 30–100)
ALBUMIN SERPL-MCNC: 4.7 G/DL (ref 3.5–5.2)
ALBUMIN/GLOB SERPL: 2.4 G/DL
ALP SERPL-CCNC: 83 U/L (ref 39–117)
ALT SERPL W P-5'-P-CCNC: 22 U/L (ref 1–33)
ANION GAP SERPL CALCULATED.3IONS-SCNC: 13 MMOL/L (ref 5–15)
AST SERPL-CCNC: 16 U/L (ref 1–32)
BILIRUB SERPL-MCNC: 0.7 MG/DL (ref 0–1.2)
BUN SERPL-MCNC: 12 MG/DL (ref 6–20)
BUN/CREAT SERPL: 14.6 (ref 7–25)
CALCIUM SPEC-SCNC: 9.4 MG/DL (ref 8.6–10.5)
CHLORIDE SERPL-SCNC: 101 MMOL/L (ref 98–107)
CHOLEST SERPL-MCNC: 228 MG/DL (ref 0–200)
CO2 SERPL-SCNC: 24 MMOL/L (ref 22–29)
CREAT SERPL-MCNC: 0.82 MG/DL (ref 0.57–1)
EGFRCR SERPLBLD CKD-EPI 2021: 95.2 ML/MIN/1.73
GLOBULIN UR ELPH-MCNC: 2 GM/DL
GLUCOSE SERPL-MCNC: 88 MG/DL (ref 65–99)
HBA1C MFR BLD: 5.6 % (ref 4.8–5.6)
HDLC SERPL-MCNC: 47 MG/DL (ref 40–60)
LDLC SERPL CALC-MCNC: 147 MG/DL (ref 0–100)
LDLC/HDLC SERPL: 3.06 {RATIO}
POTASSIUM SERPL-SCNC: 4.3 MMOL/L (ref 3.5–5.2)
PROT SERPL-MCNC: 6.7 G/DL (ref 6–8.5)
SODIUM SERPL-SCNC: 138 MMOL/L (ref 136–145)
T4 FREE SERPL-MCNC: 1.35 NG/DL (ref 0.93–1.7)
TRIGL SERPL-MCNC: 186 MG/DL (ref 0–150)
TSH SERPL DL<=0.05 MIU/L-ACNC: 1.61 UIU/ML (ref 0.27–4.2)
VLDLC SERPL-MCNC: 34 MG/DL (ref 5–40)

## 2023-03-01 RX ORDER — ERGOCALCIFEROL 1.25 MG/1
CAPSULE ORAL
Qty: 24 CAPSULE | Refills: 1 | Status: SHIPPED | OUTPATIENT
Start: 2023-03-01

## 2023-03-01 NOTE — TELEPHONE ENCOUNTER
Last visit 1/16/23  Next 1/15/24  VIT D 73211 REFILL REQUEST - WASN'T SURE IF YOU WANTED TO FILL.  DIDN'T PUT IN # REFILLS

## 2023-08-28 RX ORDER — ERGOCALCIFEROL 1.25 MG/1
CAPSULE ORAL
Qty: 24 CAPSULE | Refills: 1 | Status: SHIPPED | OUTPATIENT
Start: 2023-08-28

## 2023-08-28 NOTE — TELEPHONE ENCOUNTER
Rx Refill Note  Requested Prescriptions     Pending Prescriptions Disp Refills    vitamin D (ERGOCALCIFEROL) 1.25 MG (81702 UT) capsule capsule [Pharmacy Med Name: VIT D2 (ERGOCAL) 1.25MG(50,000U) CP] 24 capsule 1     Sig: TAKE ONE CAPSULE BY MOUTH 2 TIMES A WEEK      Last office visit with prescribing clinician: 1/16/2023   Last telemedicine visit with prescribing clinician: 01/15/2024   Next office visit with prescribing clinician: 1/15/2024                         Would you like a call back once the refill request has been completed: [] Yes [] No    If the office needs to give you a call back, can they leave a voicemail: [] Yes [] No    Yesenia Herrera CMA  08/28/23, 13:29 EDT

## 2023-08-31 RX ORDER — LEVOTHYROXINE SODIUM 0.05 MG/1
TABLET ORAL
Qty: 90 TABLET | Refills: 1 | Status: SHIPPED | OUTPATIENT
Start: 2023-08-31

## 2023-08-31 NOTE — TELEPHONE ENCOUNTER
Rx Refill Note    Requested Prescriptions     Pending Prescriptions Disp Refills    levothyroxine (SYNTHROID, LEVOTHROID) 50 MCG tablet [Pharmacy Med Name: LEVOTHYROXINE 50 MCG TABLET] 90 tablet 1     Sig: TAKE ONE TABLET BY MOUTH DAILY        Last office visit with prescribing clinician: 1/16/2023     Next office visit with prescribing clinician: 1/15/2024   {

## 2024-02-21 NOTE — TELEPHONE ENCOUNTER
Rx Refill Note  Requested Prescriptions     Pending Prescriptions Disp Refills    vitamin D (ERGOCALCIFEROL) 1.25 MG (79099 UT) capsule capsule [Pharmacy Med Name: VITAMIN D2 1.25MG(50,000 UNIT)] 24 capsule 1     Sig: TAKE ONE CAPSULE BY MOUTH 2 TIMES A WEEK      Last office visit with prescribing clinician: 1/16/2023     Next office visit with prescribing clinician: 3/25/2024       Jannie Duffy MA  02/21/24, 14:35 EST

## 2024-02-22 RX ORDER — ERGOCALCIFEROL 1.25 MG/1
CAPSULE ORAL
Qty: 24 CAPSULE | Refills: 1 | Status: SHIPPED | OUTPATIENT
Start: 2024-02-22

## 2024-02-27 RX ORDER — LEVOTHYROXINE SODIUM 0.05 MG/1
50 TABLET ORAL DAILY
Qty: 30 TABLET | Refills: 0 | Status: SHIPPED | OUTPATIENT
Start: 2024-02-27

## 2024-02-27 NOTE — TELEPHONE ENCOUNTER
Rx Refill Note    Requested Prescriptions     Pending Prescriptions Disp Refills    levothyroxine (SYNTHROID, LEVOTHROID) 50 MCG tablet [Pharmacy Med Name: LEVOTHYROXINE 50 MCG TABLET] 90 tablet 1     Sig: TAKE 1 TABLET BY MOUTH DAILY        Last office visit with prescribing clinician: 1/16/2023     Next office visit with prescribing clinician: 3/25/2024   {

## 2024-03-25 ENCOUNTER — OFFICE VISIT (OUTPATIENT)
Dept: ENDOCRINOLOGY | Facility: CLINIC | Age: 39
End: 2024-03-25
Payer: COMMERCIAL

## 2024-03-25 VITALS
WEIGHT: 260.4 LBS | SYSTOLIC BLOOD PRESSURE: 112 MMHG | HEART RATE: 106 BPM | OXYGEN SATURATION: 99 % | DIASTOLIC BLOOD PRESSURE: 70 MMHG | BODY MASS INDEX: 47.92 KG/M2 | HEIGHT: 62 IN

## 2024-03-25 DIAGNOSIS — R21 RASH: ICD-10-CM

## 2024-03-25 DIAGNOSIS — E78.1 PRIMARY HYPERTRIGLYCERIDEMIA: Primary | ICD-10-CM

## 2024-03-25 DIAGNOSIS — E55.9 VITAMIN D DEFICIENCY: ICD-10-CM

## 2024-03-25 DIAGNOSIS — E03.9 HYPOTHYROIDISM, UNSPECIFIED TYPE: ICD-10-CM

## 2024-03-25 PROCEDURE — 85025 COMPLETE CBC W/AUTO DIFF WBC: CPT | Performed by: INTERNAL MEDICINE

## 2024-03-25 PROCEDURE — 84443 ASSAY THYROID STIM HORMONE: CPT | Performed by: INTERNAL MEDICINE

## 2024-03-25 PROCEDURE — 80061 LIPID PANEL: CPT | Performed by: INTERNAL MEDICINE

## 2024-03-25 PROCEDURE — 83520 IMMUNOASSAY QUANT NOS NONAB: CPT | Performed by: INTERNAL MEDICINE

## 2024-03-25 PROCEDURE — 84439 ASSAY OF FREE THYROXINE: CPT | Performed by: INTERNAL MEDICINE

## 2024-03-25 PROCEDURE — 80053 COMPREHEN METABOLIC PANEL: CPT | Performed by: INTERNAL MEDICINE

## 2024-03-25 PROCEDURE — 99214 OFFICE O/P EST MOD 30 MIN: CPT | Performed by: INTERNAL MEDICINE

## 2024-03-25 PROCEDURE — 83036 HEMOGLOBIN GLYCOSYLATED A1C: CPT | Performed by: INTERNAL MEDICINE

## 2024-03-25 PROCEDURE — 36415 COLL VENOUS BLD VENIPUNCTURE: CPT | Performed by: INTERNAL MEDICINE

## 2024-03-25 PROCEDURE — 82306 VITAMIN D 25 HYDROXY: CPT | Performed by: INTERNAL MEDICINE

## 2024-03-25 RX ORDER — LEVOTHYROXINE SODIUM 0.05 MG/1
50 TABLET ORAL DAILY
Qty: 90 TABLET | Refills: 1 | Status: SHIPPED | OUTPATIENT
Start: 2024-03-25

## 2024-03-25 RX ORDER — ERGOCALCIFEROL 1.25 MG/1
50000 CAPSULE ORAL 2 TIMES WEEKLY
Qty: 24 CAPSULE | Refills: 1 | Status: SHIPPED | OUTPATIENT
Start: 2024-03-25

## 2024-03-25 NOTE — PROGRESS NOTES
Marly Snyder 39 y.o.  CC:Follow-up, Hypothyroidism, Hyperlipidemia, and Vitamin D Deficiency    [place]: Follow-up, Hypothyroidism, Hyperlipidemia, and Vitamin D Deficiency    Taking synthroid 50 mcg daily   Bp is good   No new c/o   Is taking vitamin D supplement twice weekly   Healthy overall   Dermatology- Dr Estephanie Muir- [facility] - recommended tryptase level for evaluation of rash     Allergies   Allergen Reactions    Morphine Anaphylaxis    Sulfa Antibiotics Rash       Current Outpatient Medications:     levothyroxine (SYNTHROID, LEVOTHROID) 50 MCG tablet, Take 1 tablet by mouth Daily., Disp: 90 tablet, Rfl: 1    vitamin D (ERGOCALCIFEROL) 1.25 MG (15285 UT) capsule capsule, Take 1 capsule by mouth 2 (Two) Times a Week., Disp: 24 capsule, Rfl: 1    ammonium lactate (LAC-HYDRIN) 12 % lotion, Apply  topically to the appropriate area as directed 2 (Two) Times a Day. to affected area(s), Disp: 225 g, Rfl: 1    spironolactone (ALDACTONE) 50 MG tablet, Take 100 mg by mouth Daily., Disp: , Rfl:   Patient Active Problem List    Diagnosis     Xerosis cutis [L85.3]     Primary hypertriglyceridemia [E78.1]     Hyperlipidemia [E78.5]     Hypothyroidism [E03.9]     Vitamin D deficiency [E55.9]      Review of Systems   Constitutional:  Negative for activity change, appetite change and unexpected weight change.   HENT:  Negative for congestion and rhinorrhea.    Eyes:  Negative for visual disturbance.   Respiratory:  Negative for cough and shortness of breath.    Cardiovascular:  Negative for palpitations and leg swelling.   Gastrointestinal:  Negative for constipation, diarrhea and nausea.   Genitourinary:  Negative for hematuria.   Musculoskeletal:  Negative for arthralgias, back pain, gait problem, joint swelling and myalgias.   Skin:  Negative for color change, rash and wound.   Allergic/Immunologic: Negative for environmental allergies, food allergies and immunocompromised state.   Neurological:  Negative  "for dizziness, weakness and light-headedness.   Psychiatric/Behavioral:  Negative for confusion, decreased concentration, dysphoric mood and sleep disturbance. The patient is not nervous/anxious.      Social History     Socioeconomic History    Marital status: Single   Tobacco Use    Smoking status: Never    Smokeless tobacco: Never   Substance and Sexual Activity    Alcohol use: Yes     Alcohol/week: 3.0 standard drinks of alcohol     Types: 2 Glasses of wine, 1 Drinks containing 0.5 oz of alcohol per week     Comment: 1 - 3 drinks/week    Drug use: No    Sexual activity: Not Currently     Partners: Male     Birth control/protection: Condom, I.U.D.     Comment: Juan R Insertion: 12/21/18     Family History   Problem Relation Age of Onset    Hypertension Father     Hyperlipidemia Father     Coronary artery disease Father     Cancer Paternal Grandfather         lung    Breast cancer Other     Cancer Paternal Aunt         Great Aunt - Bone     /70   Pulse 106   Ht 158.1 cm (62.25\")   Wt 118 kg (260 lb 6.4 oz)   SpO2 99%   BMI 47.25 kg/m²   Physical Exam  Vitals and nursing note reviewed.   Constitutional:       Appearance: Normal appearance. She is well-developed.   HENT:      Head: Normocephalic and atraumatic.   Eyes:      General: Lids are normal.      Extraocular Movements: Extraocular movements intact.      Conjunctiva/sclera: Conjunctivae normal.      Pupils: Pupils are equal, round, and reactive to light.   Neck:      Thyroid: No thyroid mass or thyromegaly.      Vascular: No carotid bruit.      Trachea: Trachea normal. No tracheal deviation.   Cardiovascular:      Rate and Rhythm: Normal rate and regular rhythm.      Pulses: Normal pulses.      Heart sounds: Normal heart sounds. No murmur heard.     No friction rub. No gallop.   Pulmonary:      Effort: Pulmonary effort is normal. No respiratory distress.      Breath sounds: Normal breath sounds. No wheezing.   Musculoskeletal:         General: No " deformity. Normal range of motion.      Cervical back: Normal range of motion and neck supple.   Lymphadenopathy:      Cervical: No cervical adenopathy.   Skin:     General: Skin is warm and dry.      Findings: No erythema or rash.      Nails: There is no clubbing.   Neurological:      General: No focal deficit present.      Mental Status: She is alert and oriented to person, place, and time.      Cranial Nerves: No cranial nerve deficit.      Deep Tendon Reflexes: Reflexes are normal and symmetric. Reflexes normal.   Psychiatric:         Mood and Affect: Mood normal.         Speech: Speech normal.         Behavior: Behavior normal.         Thought Content: Thought content normal.         Judgment: Judgment normal.       Results for orders placed or performed in visit on 01/16/23   Comprehensive Metabolic Panel    Specimen: Arm, Left; Blood   Result Value Ref Range    Glucose 88 65 - 99 mg/dL    BUN 12 6 - 20 mg/dL    Creatinine 0.82 0.57 - 1.00 mg/dL    Sodium 138 136 - 145 mmol/L    Potassium 4.3 3.5 - 5.2 mmol/L    Chloride 101 98 - 107 mmol/L    CO2 24.0 22.0 - 29.0 mmol/L    Calcium 9.4 8.6 - 10.5 mg/dL    Total Protein 6.7 6.0 - 8.5 g/dL    Albumin 4.7 3.5 - 5.2 g/dL    ALT (SGPT) 22 1 - 33 U/L    AST (SGOT) 16 1 - 32 U/L    Alkaline Phosphatase 83 39 - 117 U/L    Total Bilirubin 0.7 0.0 - 1.2 mg/dL    Globulin 2.0 gm/dL    A/G Ratio 2.4 g/dL    BUN/Creatinine Ratio 14.6 7.0 - 25.0    Anion Gap 13.0 5.0 - 15.0 mmol/L    eGFR 95.2 >60.0 mL/min/1.73   CBC Auto Differential    Specimen: Arm, Left; Blood   Result Value Ref Range    WBC 7.49 3.40 - 10.80 10*3/mm3    RBC 4.50 3.77 - 5.28 10*6/mm3    Hemoglobin 14.4 12.0 - 15.9 g/dL    Hematocrit 40.9 34.0 - 46.6 %    MCV 90.9 79.0 - 97.0 fL    MCH 32.0 26.6 - 33.0 pg    MCHC 35.2 31.5 - 35.7 g/dL    RDW 11.5 (L) 12.3 - 15.4 %    RDW-SD 38.1 37.0 - 54.0 fl    MPV 10.2 6.0 - 12.0 fL    Platelets 327 140 - 450 10*3/mm3    Neutrophil % 65.4 42.7 - 76.0 %    Lymphocyte %  25.8 19.6 - 45.3 %    Monocyte % 6.5 5.0 - 12.0 %    Eosinophil % 1.7 0.3 - 6.2 %    Basophil % 0.3 0.0 - 1.5 %    Immature Grans % 0.3 0.0 - 0.5 %    Neutrophils, Absolute 4.90 1.70 - 7.00 10*3/mm3    Lymphocytes, Absolute 1.93 0.70 - 3.10 10*3/mm3    Monocytes, Absolute 0.49 0.10 - 0.90 10*3/mm3    Eosinophils, Absolute 0.13 0.00 - 0.40 10*3/mm3    Basophils, Absolute 0.02 0.00 - 0.20 10*3/mm3    Immature Grans, Absolute 0.02 0.00 - 0.05 10*3/mm3    nRBC 0.0 0.0 - 0.2 /100 WBC   Lipid Panel    Specimen: Arm, Left; Blood   Result Value Ref Range    Total Cholesterol 228 (H) 0 - 200 mg/dL    Triglycerides 186 (H) 0 - 150 mg/dL    HDL Cholesterol 47 40 - 60 mg/dL    LDL Cholesterol  147 (H) 0 - 100 mg/dL    VLDL Cholesterol 34 5 - 40 mg/dL    LDL/HDL Ratio 3.06    TSH    Specimen: Arm, Left; Blood   Result Value Ref Range    TSH 1.610 0.270 - 4.200 uIU/mL   T4, Free    Specimen: Arm, Left; Blood   Result Value Ref Range    Free T4 1.35 0.93 - 1.70 ng/dL   Vitamin D,25-Hydroxy    Specimen: Arm, Left; Blood   Result Value Ref Range    25 Hydroxy, Vitamin D 36.8 30.0 - 100.0 ng/ml   Hemoglobin A1c    Specimen: Arm, Left; Blood   Result Value Ref Range    Hemoglobin A1C 5.60 4.80 - 5.60 %     Diagnoses and all orders for this visit:    1. Primary hypertriglyceridemia (Primary)  Assessment & Plan:  Continue low fat diet  Check flp     Orders:  -     Comprehensive Metabolic Panel; Future  -     Lipid Panel; Future  -     TSH; Future  -     T4, Free; Future  -     Hemoglobin A1c; Future  -     Comprehensive Metabolic Panel  -     Lipid Panel  -     TSH  -     T4, Free  -     Hemoglobin A1c    2. Hypothyroidism, unspecified type  Assessment & Plan:  Taking synthroid 50 mcg daily   Check tfts     Orders:  -     CBC & Differential; Future  -     CBC & Differential    3. Vitamin D deficiency  Assessment & Plan:  Continue supplement- update vitamin D levels     Orders:  -     Vitamin D,25-Hydroxy; Future  -     Vitamin  D,25-Hydroxy    4. Rash  -     Tryptase; Future  -     Tryptase    Other orders  -     levothyroxine (SYNTHROID, LEVOTHROID) 50 MCG tablet; Take 1 tablet by mouth Daily.  Dispense: 90 tablet; Refill: 1  -     vitamin D (ERGOCALCIFEROL) 1.25 MG (07245 UT) capsule capsule; Take 1 capsule by mouth 2 (Two) Times a Week.  Dispense: 24 capsule; Refill: 1    Return in about 1 year (around 3/25/2025).    Electronically signed by: Vi Farfan MD

## 2024-03-26 LAB
25(OH)D3 SERPL-MCNC: 35.5 NG/ML (ref 30–100)
ALBUMIN SERPL-MCNC: 4.2 G/DL (ref 3.5–5.2)
ALBUMIN/GLOB SERPL: 1.8 G/DL
ALP SERPL-CCNC: 81 U/L (ref 39–117)
ALT SERPL W P-5'-P-CCNC: 15 U/L (ref 1–33)
ANION GAP SERPL CALCULATED.3IONS-SCNC: 16.1 MMOL/L (ref 5–15)
AST SERPL-CCNC: 13 U/L (ref 1–32)
BASOPHILS # BLD AUTO: 0.03 10*3/MM3 (ref 0–0.2)
BASOPHILS NFR BLD AUTO: 0.3 % (ref 0–1.5)
BILIRUB SERPL-MCNC: 0.3 MG/DL (ref 0–1.2)
BUN SERPL-MCNC: 17 MG/DL (ref 6–20)
BUN/CREAT SERPL: 20 (ref 7–25)
CALCIUM SPEC-SCNC: 9.5 MG/DL (ref 8.6–10.5)
CHLORIDE SERPL-SCNC: 102 MMOL/L (ref 98–107)
CHOLEST SERPL-MCNC: 220 MG/DL (ref 0–200)
CO2 SERPL-SCNC: 19.9 MMOL/L (ref 22–29)
CREAT SERPL-MCNC: 0.85 MG/DL (ref 0.57–1)
DEPRECATED RDW RBC AUTO: 43.7 FL (ref 37–54)
EGFRCR SERPLBLD CKD-EPI 2021: 90.6 ML/MIN/1.73
EOSINOPHIL # BLD AUTO: 0.21 10*3/MM3 (ref 0–0.4)
EOSINOPHIL NFR BLD AUTO: 1.8 % (ref 0.3–6.2)
ERYTHROCYTE [DISTWIDTH] IN BLOOD BY AUTOMATED COUNT: 12.5 % (ref 12.3–15.4)
GLOBULIN UR ELPH-MCNC: 2.4 GM/DL
GLUCOSE SERPL-MCNC: 80 MG/DL (ref 65–99)
HBA1C MFR BLD: 5.5 % (ref 4.8–5.6)
HCT VFR BLD AUTO: 41.4 % (ref 34–46.6)
HDLC SERPL-MCNC: 50 MG/DL (ref 40–60)
HGB BLD-MCNC: 13.9 G/DL (ref 12–15.9)
IMM GRANULOCYTES # BLD AUTO: 0.06 10*3/MM3 (ref 0–0.05)
IMM GRANULOCYTES NFR BLD AUTO: 0.5 % (ref 0–0.5)
LDLC SERPL CALC-MCNC: 149 MG/DL (ref 0–100)
LDLC/HDLC SERPL: 2.94 {RATIO}
LYMPHOCYTES # BLD AUTO: 3.22 10*3/MM3 (ref 0.7–3.1)
LYMPHOCYTES NFR BLD AUTO: 27.2 % (ref 19.6–45.3)
MCH RBC QN AUTO: 32.3 PG (ref 26.6–33)
MCHC RBC AUTO-ENTMCNC: 33.6 G/DL (ref 31.5–35.7)
MCV RBC AUTO: 96.3 FL (ref 79–97)
MONOCYTES # BLD AUTO: 0.86 10*3/MM3 (ref 0.1–0.9)
MONOCYTES NFR BLD AUTO: 7.3 % (ref 5–12)
NEUTROPHILS NFR BLD AUTO: 62.9 % (ref 42.7–76)
NEUTROPHILS NFR BLD AUTO: 7.45 10*3/MM3 (ref 1.7–7)
NRBC BLD AUTO-RTO: 0 /100 WBC (ref 0–0.2)
PLATELET # BLD AUTO: 349 10*3/MM3 (ref 140–450)
PMV BLD AUTO: 10.3 FL (ref 6–12)
POTASSIUM SERPL-SCNC: 3.8 MMOL/L (ref 3.5–5.2)
PROT SERPL-MCNC: 6.6 G/DL (ref 6–8.5)
RBC # BLD AUTO: 4.3 10*6/MM3 (ref 3.77–5.28)
SODIUM SERPL-SCNC: 138 MMOL/L (ref 136–145)
T4 FREE SERPL-MCNC: 1.42 NG/DL (ref 0.93–1.7)
TRIGL SERPL-MCNC: 115 MG/DL (ref 0–150)
TSH SERPL DL<=0.05 MIU/L-ACNC: 2.49 UIU/ML (ref 0.27–4.2)
VLDLC SERPL-MCNC: 21 MG/DL (ref 5–40)
WBC NRBC COR # BLD AUTO: 11.83 10*3/MM3 (ref 3.4–10.8)

## 2024-03-27 LAB — TRYPTASE SERPL-MCNC: 26.9 UG/L (ref 2.2–13.2)

## 2024-05-14 NOTE — PROGRESS NOTES
Follow-up ([facility])      REASON FOR CONSULTATION:   elevated serum tryptase                             REQUESTING PHYSICIAN: Estephanie Muir MD  RECORDS OBTAINED:  Records of the patients history including those from the electronic medical record were reviewed and summarized in detail.    HISTORY OF PRESENT ILLNESS:  The patient is a 39 y.o. year old female who has been referred to our clinic for further evaluation of possible mastocytosis.  Dr. Estephanie Muir, dermatologist had recommended checking tryptase level for evaluation of her rash.  Tryptase level elevated at 26.9, and so referred to our clinic for further evaluation and management    Today, she is here to establish care with me.  She reports having rash over her chest as well as bilateral arms since at least puberty.  Reports over the last 5 years she has noted rash over her thighs which spontaneously resolves, and is triggered by heat such as hot bath.  She has pictures in her phone.  Denies any other symptoms or complaints.    Family history - paternal grandfather in his 70s lung cancer (ex smoker quit decades before diagnosis), paternal great aunt with cancer  to bones (type unknown) in her 70s. No h/o mastocytosis, blood cancers. Maternal grandmother and some maternal cousins with rosacea      Past Medical History:   Diagnosis Date    Hypothyroidism 2011     Past Surgical History:   Procedure Laterality Date    ADENOIDECTOMY      TONSILLECTOMY AND ADENOIDECTOMY         MEDICATIONS    Current Outpatient Medications:     ammonium lactate (LAC-HYDRIN) 12 % lotion, Apply  topically to the appropriate area as directed 2 (Two) Times a Day. to affected area(s), Disp: 225 g, Rfl: 1    levothyroxine (SYNTHROID, LEVOTHROID) 50 MCG tablet, Take 1 tablet by mouth Daily., Disp: 90 tablet, Rfl: 1    spironolactone (ALDACTONE) 50 MG tablet, Take 2 tablets by mouth Daily., Disp: , Rfl:     vitamin D (ERGOCALCIFEROL) 1.25 MG (16192 UT) capsule capsule,  "Take 1 capsule by mouth 2 (Two) Times a Week., Disp: 24 capsule, Rfl: 1    ALLERGIES:     Allergies   Allergen Reactions    Morphine Anaphylaxis    Sulfa Antibiotics Rash       SOCIAL HISTORY:       Social History     Socioeconomic History    Marital status: Single   Tobacco Use    Smoking status: Never    Smokeless tobacco: Never   Substance and Sexual Activity    Alcohol use: Yes     Alcohol/week: 3.0 standard drinks of alcohol     Types: 2 Glasses of wine, 1 Drinks containing 0.5 oz of alcohol per week     Comment: 1 - 3 drinks/week    Drug use: No    Sexual activity: Not Currently     Partners: Male     Birth control/protection: Condom, I.U.D.     Comment: Juan R Insertion: 12/21/18         FAMILY HISTORY:  Family History   Problem Relation Age of Onset    Hypertension Father     Hyperlipidemia Father     Coronary artery disease Father     Cancer Paternal Grandfather         lung    Breast cancer Other     Cancer Paternal Aunt         Great Aunt - Bone       REVIEW OF SYSTEMS:  Review of Systems   Constitutional: Negative.    HENT: Negative.     Eyes: Negative.    Respiratory: Negative.     Cardiovascular: Negative.    Gastrointestinal: Negative.    Skin:  Positive for rash (Chronic rash over bilateral arms and chest since at least puberty; intermittent rash on thighs which is triggered by heat and resolve spontaneously).              Vitals:    05/20/24 1441   BP: 131/86   Pulse: 81   Resp: 16   Temp: 98 °F (36.7 °C)   TempSrc: Oral   SpO2: 96%   Weight: 118 kg (260 lb 12.8 oz)   Height: 158.1 cm (62.24\")   PainSc: 0-No pain         5/20/2024     2:42 PM   Current Status   ECOG score 0      PHYSICAL EXAM:    CONSTITUTIONAL:  Vital signs reviewed.  No distress, looks comfortable.  EYES:  Conjunctiva and lids unremarkable.  PERRLA  EARS,NOSE,MOUTH,THROAT:  Ears and nose appear unremarkable.  Lips, teeth, gums appear unremarkable.  RESPIRATORY:  Normal respiratory effort.  Lungs clear to auscultation " bilaterally.  CARDIOVASCULAR:  Normal S1, S2.  No murmurs rubs or gallops.  No significant lower extremity edema.  GASTROINTESTINAL: Abdomen appears unremarkable.  Nontender.  No hepatomegaly.  No splenomegaly.  MUSCULOSKELETAL:  Unremarkable digits/nails.  No cyanosis or clubbing.  No apparent joint deformities.  SKIN: Macular erythematous/telangiectatic rash noted over bilateral arms, upper chest.    PSYCHIATRIC:  Normal judgment and insight.  Normal mood and affect.     RECENT LABS:        WBC   Date Value Ref Range Status   05/20/2024 11.31 (H) 3.40 - 10.80 10*3/mm3 Final   03/25/2024 11.83 (H) 3.40 - 10.80 10*3/mm3 Final   01/16/2023 7.49 3.40 - 10.80 10*3/mm3 Final   04/18/2014 8.07 3.50 - 10.80 K/mcL Final     Hemoglobin   Date Value Ref Range Status   05/20/2024 13.9 12.0 - 15.9 g/dL Final   03/25/2024 13.9 12.0 - 15.9 g/dL Final   01/16/2023 14.4 12.0 - 15.9 g/dL Final   04/18/2014 13.3 11.5 - 15.5 g/dL Final     Platelets   Date Value Ref Range Status   05/20/2024 286 140 - 450 10*3/mm3 Final   03/25/2024 349 140 - 450 10*3/mm3 Final   01/16/2023 327 140 - 450 10*3/mm3 Final   04/18/2014 317 150 - 450 K/mcL Final           Assessment & Plan     *Elevated tryptase  *Telangiectasia macularis eruptive perstans (TMEP)  -I reviewed notes from her dermatologist Dr. Muir (scanned in media).  A tryptase level was checked given her chronic telangiectasia macularis eruptive perstans (TMEP).  -I have reviewed the possible diagnosis of systemic mastocytosis, which is a rare disorder in which pathology mast cells accumulate in tissues.  I discussed a bone marrow biopsy is usually required for diagnosis.  She is agreeable.    *Neutrophilic leukocytosis  - No fever or signs of localizing infection  - Stable neutrophilic leukocytosis since March 2024.  - Will continue to monitor.  No interventions at current point      Plan:  -Will order a bone marrow biopsy with cytogenetics, flow cytometry, NexGen sequencing.we will  request stains for tryptase,  and CD25, and aspirate analysis for [id number] KIT mutation

## 2024-05-14 NOTE — H&P (VIEW-ONLY)
Follow-up ([facility])      REASON FOR CONSULTATION:   elevated serum tryptase                             REQUESTING PHYSICIAN: Etsephanie Muir MD  RECORDS OBTAINED:  Records of the patients history including those from the electronic medical record were reviewed and summarized in detail.    HISTORY OF PRESENT ILLNESS:  The patient is a 39 y.o. year old female who has been referred to our clinic for further evaluation of possible mastocytosis.  Dr. Estephanie Muir, dermatologist had recommended checking tryptase level for evaluation of her rash.  Tryptase level elevated at 26.9, and so referred to our clinic for further evaluation and management    Today, she is here to establish care with me.  She reports having rash over her chest as well as bilateral arms since at least puberty.  Reports over the last 5 years she has noted rash over her thighs which spontaneously resolves, and is triggered by heat such as hot bath.  She has pictures in her phone.  Denies any other symptoms or complaints.    Family history - paternal grandfather in his 70s lung cancer (ex smoker quit decades before diagnosis), paternal great aunt with cancer  to bones (type unknown) in her 70s. No h/o mastocytosis, blood cancers. Maternal grandmother and some maternal cousins with rosacea      Past Medical History:   Diagnosis Date    Hypothyroidism 2011     Past Surgical History:   Procedure Laterality Date    ADENOIDECTOMY      TONSILLECTOMY AND ADENOIDECTOMY         MEDICATIONS    Current Outpatient Medications:     ammonium lactate (LAC-HYDRIN) 12 % lotion, Apply  topically to the appropriate area as directed 2 (Two) Times a Day. to affected area(s), Disp: 225 g, Rfl: 1    levothyroxine (SYNTHROID, LEVOTHROID) 50 MCG tablet, Take 1 tablet by mouth Daily., Disp: 90 tablet, Rfl: 1    spironolactone (ALDACTONE) 50 MG tablet, Take 2 tablets by mouth Daily., Disp: , Rfl:     vitamin D (ERGOCALCIFEROL) 1.25 MG (89015 UT) capsule capsule,  "Take 1 capsule by mouth 2 (Two) Times a Week., Disp: 24 capsule, Rfl: 1    ALLERGIES:     Allergies   Allergen Reactions    Morphine Anaphylaxis    Sulfa Antibiotics Rash       SOCIAL HISTORY:       Social History     Socioeconomic History    Marital status: Single   Tobacco Use    Smoking status: Never    Smokeless tobacco: Never   Substance and Sexual Activity    Alcohol use: Yes     Alcohol/week: 3.0 standard drinks of alcohol     Types: 2 Glasses of wine, 1 Drinks containing 0.5 oz of alcohol per week     Comment: 1 - 3 drinks/week    Drug use: No    Sexual activity: Not Currently     Partners: Male     Birth control/protection: Condom, I.U.D.     Comment: Juan R Insertion: 12/21/18         FAMILY HISTORY:  Family History   Problem Relation Age of Onset    Hypertension Father     Hyperlipidemia Father     Coronary artery disease Father     Cancer Paternal Grandfather         lung    Breast cancer Other     Cancer Paternal Aunt         Great Aunt - Bone       REVIEW OF SYSTEMS:  Review of Systems   Constitutional: Negative.    HENT: Negative.     Eyes: Negative.    Respiratory: Negative.     Cardiovascular: Negative.    Gastrointestinal: Negative.    Skin:  Positive for rash (Chronic rash over bilateral arms and chest since at least puberty; intermittent rash on thighs which is triggered by heat and resolve spontaneously).              Vitals:    05/20/24 1441   BP: 131/86   Pulse: 81   Resp: 16   Temp: 98 °F (36.7 °C)   TempSrc: Oral   SpO2: 96%   Weight: 118 kg (260 lb 12.8 oz)   Height: 158.1 cm (62.24\")   PainSc: 0-No pain         5/20/2024     2:42 PM   Current Status   ECOG score 0      PHYSICAL EXAM:    CONSTITUTIONAL:  Vital signs reviewed.  No distress, looks comfortable.  EYES:  Conjunctiva and lids unremarkable.  PERRLA  EARS,NOSE,MOUTH,THROAT:  Ears and nose appear unremarkable.  Lips, teeth, gums appear unremarkable.  RESPIRATORY:  Normal respiratory effort.  Lungs clear to auscultation " bilaterally.  CARDIOVASCULAR:  Normal S1, S2.  No murmurs rubs or gallops.  No significant lower extremity edema.  GASTROINTESTINAL: Abdomen appears unremarkable.  Nontender.  No hepatomegaly.  No splenomegaly.  MUSCULOSKELETAL:  Unremarkable digits/nails.  No cyanosis or clubbing.  No apparent joint deformities.  SKIN: Macular erythematous/telangiectatic rash noted over bilateral arms, upper chest.    PSYCHIATRIC:  Normal judgment and insight.  Normal mood and affect.     RECENT LABS:        WBC   Date Value Ref Range Status   05/20/2024 11.31 (H) 3.40 - 10.80 10*3/mm3 Final   03/25/2024 11.83 (H) 3.40 - 10.80 10*3/mm3 Final   01/16/2023 7.49 3.40 - 10.80 10*3/mm3 Final   04/18/2014 8.07 3.50 - 10.80 K/mcL Final     Hemoglobin   Date Value Ref Range Status   05/20/2024 13.9 12.0 - 15.9 g/dL Final   03/25/2024 13.9 12.0 - 15.9 g/dL Final   01/16/2023 14.4 12.0 - 15.9 g/dL Final   04/18/2014 13.3 11.5 - 15.5 g/dL Final     Platelets   Date Value Ref Range Status   05/20/2024 286 140 - 450 10*3/mm3 Final   03/25/2024 349 140 - 450 10*3/mm3 Final   01/16/2023 327 140 - 450 10*3/mm3 Final   04/18/2014 317 150 - 450 K/mcL Final           Assessment & Plan     *Elevated tryptase  *Telangiectasia macularis eruptive perstans (TMEP)  -I reviewed notes from her dermatologist Dr. Muir (scanned in media).  A tryptase level was checked given her chronic telangiectasia macularis eruptive perstans (TMEP).  -I have reviewed the possible diagnosis of systemic mastocytosis, which is a rare disorder in which pathology mast cells accumulate in tissues.  I discussed a bone marrow biopsy is usually required for diagnosis.  She is agreeable.    *Neutrophilic leukocytosis  - No fever or signs of localizing infection  - Stable neutrophilic leukocytosis since March 2024.  - Will continue to monitor.  No interventions at current point      Plan:  -Will order a bone marrow biopsy with cytogenetics, flow cytometry, NexGen sequencing.we will  request stains for tryptase,  and CD25, and aspirate analysis for D816V KIT mutation

## 2024-05-20 ENCOUNTER — CONSULT (OUTPATIENT)
Dept: ONCOLOGY | Facility: CLINIC | Age: 39
End: 2024-05-20
Payer: COMMERCIAL

## 2024-05-20 ENCOUNTER — PRIOR AUTHORIZATION (OUTPATIENT)
Dept: ONCOLOGY | Facility: CLINIC | Age: 39
End: 2024-05-20
Payer: COMMERCIAL

## 2024-05-20 ENCOUNTER — LAB (OUTPATIENT)
Dept: LAB | Facility: HOSPITAL | Age: 39
End: 2024-05-20
Payer: COMMERCIAL

## 2024-05-20 VITALS
OXYGEN SATURATION: 96 % | HEART RATE: 81 BPM | HEIGHT: 62 IN | WEIGHT: 260.8 LBS | RESPIRATION RATE: 16 BRPM | DIASTOLIC BLOOD PRESSURE: 86 MMHG | SYSTOLIC BLOOD PRESSURE: 131 MMHG | TEMPERATURE: 98 F | BODY MASS INDEX: 47.99 KG/M2

## 2024-05-20 DIAGNOSIS — R74.8 ELEVATED SERUM TRYPTASE: Primary | ICD-10-CM

## 2024-05-20 DIAGNOSIS — R79.89 ABNORMAL CBC: Primary | ICD-10-CM

## 2024-05-20 DIAGNOSIS — R21 SKIN RASH: ICD-10-CM

## 2024-05-20 DIAGNOSIS — D72.9 NEUTROPHILIC LEUKOCYTOSIS: ICD-10-CM

## 2024-05-20 LAB
BASOPHILS # BLD AUTO: 0.04 10*3/MM3 (ref 0–0.2)
BASOPHILS NFR BLD AUTO: 0.4 % (ref 0–1.5)
DEPRECATED RDW RBC AUTO: 42.1 FL (ref 37–54)
EOSINOPHIL # BLD AUTO: 0.1 10*3/MM3 (ref 0–0.4)
EOSINOPHIL NFR BLD AUTO: 0.9 % (ref 0.3–6.2)
ERYTHROCYTE [DISTWIDTH] IN BLOOD BY AUTOMATED COUNT: 12.4 % (ref 12.3–15.4)
HCT VFR BLD AUTO: 39.3 % (ref 34–46.6)
HGB BLD-MCNC: 13.9 G/DL (ref 12–15.9)
IMM GRANULOCYTES # BLD AUTO: 0.15 10*3/MM3 (ref 0–0.05)
IMM GRANULOCYTES NFR BLD AUTO: 1.3 % (ref 0–0.5)
LYMPHOCYTES # BLD AUTO: 2.32 10*3/MM3 (ref 0.7–3.1)
LYMPHOCYTES NFR BLD AUTO: 20.5 % (ref 19.6–45.3)
MCH RBC QN AUTO: 32.9 PG (ref 26.6–33)
MCHC RBC AUTO-ENTMCNC: 35.4 G/DL (ref 31.5–35.7)
MCV RBC AUTO: 93.1 FL (ref 79–97)
MONOCYTES # BLD AUTO: 0.74 10*3/MM3 (ref 0.1–0.9)
MONOCYTES NFR BLD AUTO: 6.5 % (ref 5–12)
NEUTROPHILS NFR BLD AUTO: 7.96 10*3/MM3 (ref 1.7–7)
NEUTROPHILS NFR BLD AUTO: 70.4 % (ref 42.7–76)
NRBC BLD AUTO-RTO: 0 /100 WBC (ref 0–0.2)
PLATELET # BLD AUTO: 286 10*3/MM3 (ref 140–450)
PMV BLD AUTO: 10.2 FL (ref 6–12)
RBC # BLD AUTO: 4.22 10*6/MM3 (ref 3.77–5.28)
WBC NRBC COR # BLD AUTO: 11.31 10*3/MM3 (ref 3.4–10.8)

## 2024-05-20 PROCEDURE — 99204 OFFICE O/P NEW MOD 45 MIN: CPT | Performed by: STUDENT IN AN ORGANIZED HEALTH CARE EDUCATION/TRAINING PROGRAM

## 2024-05-20 PROCEDURE — 36415 COLL VENOUS BLD VENIPUNCTURE: CPT

## 2024-05-20 PROCEDURE — 85025 COMPLETE CBC W/AUTO DIFF WBC: CPT

## 2024-05-20 NOTE — PROGRESS NOTES
05/28/24 0001   Pre-Procedure Phone Call   Procedure Time Verified Yes   Arrival Time 1100   Procedure Location Verified Yes   Medical History Reviewed No   NPO Status Reinforced Yes   Ride and Caregiver Arranged Yes   Patient Knows to Bring Current Medications No   Bring Outside Films Requested No

## 2024-05-20 NOTE — TELEPHONE ENCOUNTER
No PA required for BMB codes per [facility] provider portal. Decision ID # [id number].  Flow [id number], [id number], [id number]  Flow Interp 00869, 93330  Morphology 17515  Chromosomes 53438, 35737    Patient scheduled for 5/28/24.

## 2024-05-21 RX ORDER — AMMONIUM LACTATE 12 G/100G
LOTION TOPICAL 2 TIMES DAILY
Qty: 225 G | Refills: 1 | Status: SHIPPED | OUTPATIENT
Start: 2024-05-21

## 2024-05-28 ENCOUNTER — HOSPITAL ENCOUNTER (OUTPATIENT)
Dept: CT IMAGING | Facility: HOSPITAL | Age: 39
Discharge: HOME | End: 2024-05-28
Admitting: RADIOLOGY
Payer: COMMERCIAL

## 2024-05-28 VITALS
BODY MASS INDEX: 46.07 KG/M2 | HEIGHT: 63 IN | WEIGHT: 260 LBS | RESPIRATION RATE: 18 BRPM | OXYGEN SATURATION: 96 % | HEART RATE: 79 BPM | TEMPERATURE: 98 F | SYSTOLIC BLOOD PRESSURE: 121 MMHG | DIASTOLIC BLOOD PRESSURE: 79 MMHG

## 2024-05-28 DIAGNOSIS — D72.9 NEUTROPHILIC LEUKOCYTOSIS: ICD-10-CM

## 2024-05-28 DIAGNOSIS — R21 SKIN RASH: ICD-10-CM

## 2024-05-28 DIAGNOSIS — R74.8 ELEVATED SERUM TRYPTASE: ICD-10-CM

## 2024-05-28 LAB
BASOPHILS # BLD AUTO: 0.02 10*3/MM3 (ref 0–0.2)
BASOPHILS NFR BLD AUTO: 0.2 % (ref 0–1.5)
DEPRECATED RDW RBC AUTO: 47.5 FL (ref 37–54)
EOSINOPHIL # BLD AUTO: 0.11 10*3/MM3 (ref 0–0.4)
EOSINOPHIL NFR BLD AUTO: 1 % (ref 0.3–6.2)
ERYTHROCYTE [DISTWIDTH] IN BLOOD BY AUTOMATED COUNT: 13.1 % (ref 12.3–15.4)
HCT VFR BLD AUTO: 44.5 % (ref 34–46.6)
HGB BLD-MCNC: 14.5 G/DL (ref 12–15.9)
IMM GRANULOCYTES # BLD AUTO: 0.06 10*3/MM3 (ref 0–0.05)
IMM GRANULOCYTES NFR BLD AUTO: 0.5 % (ref 0–0.5)
LYMPHOCYTES # BLD AUTO: 2.65 10*3/MM3 (ref 0.7–3.1)
LYMPHOCYTES NFR BLD AUTO: 23.3 % (ref 19.6–45.3)
MCH RBC QN AUTO: 31.8 PG (ref 26.6–33)
MCHC RBC AUTO-ENTMCNC: 32.6 G/DL (ref 31.5–35.7)
MCV RBC AUTO: 97.6 FL (ref 79–97)
MONOCYTES # BLD AUTO: 0.72 10*3/MM3 (ref 0.1–0.9)
MONOCYTES NFR BLD AUTO: 6.3 % (ref 5–12)
NEUTROPHILS NFR BLD AUTO: 68.7 % (ref 42.7–76)
NEUTROPHILS NFR BLD AUTO: 7.82 10*3/MM3 (ref 1.7–7)
NRBC BLD AUTO-RTO: 0 /100 WBC (ref 0–0.2)
PLATELET # BLD AUTO: 344 10*3/MM3 (ref 140–450)
PMV BLD AUTO: 9.7 FL (ref 6–12)
RBC # BLD AUTO: 4.56 10*6/MM3 (ref 3.77–5.28)
WBC NRBC COR # BLD AUTO: 11.38 10*3/MM3 (ref 3.4–10.8)

## 2024-05-28 PROCEDURE — 85025 COMPLETE CBC W/AUTO DIFF WBC: CPT | Performed by: RADIOLOGY

## 2024-05-28 PROCEDURE — 99152 MOD SED SAME PHYS/QHP 5/>YRS: CPT

## 2024-05-28 PROCEDURE — 77012 CT SCAN FOR NEEDLE BIOPSY: CPT

## 2024-05-28 PROCEDURE — 25010000002 FENTANYL CITRATE (PF) 50 MCG/ML SOLUTION: Performed by: RADIOLOGY

## 2024-05-28 PROCEDURE — 25010000002 MIDAZOLAM PER 1 MG: Performed by: RADIOLOGY

## 2024-05-28 PROCEDURE — 25010000002 LIDOCAINE 1 % SOLUTION: Performed by: RADIOLOGY

## 2024-05-28 RX ORDER — FENTANYL CITRATE 50 UG/ML
INJECTION, SOLUTION INTRAMUSCULAR; INTRAVENOUS AS NEEDED
Status: COMPLETED | OUTPATIENT
Start: 2024-05-28 | End: 2024-05-28

## 2024-05-28 RX ORDER — MIDAZOLAM HYDROCHLORIDE 1 MG/ML
INJECTION INTRAMUSCULAR; INTRAVENOUS AS NEEDED
Status: COMPLETED | OUTPATIENT
Start: 2024-05-28 | End: 2024-05-28

## 2024-05-28 RX ORDER — SODIUM CHLORIDE 0.9 % (FLUSH) 0.9 %
10 SYRINGE (ML) INJECTION AS NEEDED
Status: DISCONTINUED | OUTPATIENT
Start: 2024-05-28 | End: 2024-05-29 | Stop reason: HOSPADM

## 2024-05-28 RX ORDER — MIDAZOLAM HYDROCHLORIDE 1 MG/ML
0.5 INJECTION INTRAMUSCULAR; INTRAVENOUS ONCE AS NEEDED
Status: DISCONTINUED | OUTPATIENT
Start: 2024-05-28 | End: 2024-05-29 | Stop reason: HOSPADM

## 2024-05-28 RX ORDER — SODIUM CHLORIDE 9 MG/ML
25 INJECTION, SOLUTION INTRAVENOUS ONCE
Status: COMPLETED | OUTPATIENT
Start: 2024-05-28 | End: 2024-05-28

## 2024-05-28 RX ORDER — LIDOCAINE HYDROCHLORIDE 10 MG/ML
20 INJECTION, SOLUTION INFILTRATION; PERINEURAL ONCE
Status: COMPLETED | OUTPATIENT
Start: 2024-05-28 | End: 2024-05-28

## 2024-05-28 RX ADMIN — FENTANYL CITRATE 50 MCG: 50 INJECTION INTRAMUSCULAR; INTRAVENOUS at 13:39

## 2024-05-28 RX ADMIN — SODIUM CHLORIDE 25 ML/HR: 9 INJECTION, SOLUTION INTRAVENOUS at 13:29

## 2024-05-28 RX ADMIN — MIDAZOLAM 0.5 MG: 1 INJECTION INTRAMUSCULAR; INTRAVENOUS at 13:44

## 2024-05-28 RX ADMIN — LIDOCAINE HYDROCHLORIDE 20 ML: 10 INJECTION, SOLUTION INFILTRATION; PERINEURAL at 13:46

## 2024-05-28 RX ADMIN — FENTANYL CITRATE 25 MCG: 50 INJECTION INTRAMUSCULAR; INTRAVENOUS at 13:44

## 2024-05-28 RX ADMIN — FENTANYL CITRATE 25 MCG: 50 INJECTION INTRAMUSCULAR; INTRAVENOUS at 13:49

## 2024-05-28 RX ADMIN — MIDAZOLAM 1 MG: 1 INJECTION INTRAMUSCULAR; INTRAVENOUS at 13:39

## 2024-05-28 RX ADMIN — MIDAZOLAM 0.5 MG: 1 INJECTION INTRAMUSCULAR; INTRAVENOUS at 13:48

## 2024-05-28 NOTE — DISCHARGE INSTRUCTIONS
EDUCATION /DISCHARGE INSTRUCTIONS  CT/US guided biopsy:  A biopsy is a procedure done to remove tissue for further analysis.  Before images are taken to locate the target area.  Images can be obtained using ultrasound, CT or MRI.  A physician will clean your skin with antiseptic soap, place a sterile towel around the site and administer a local anesthetic to numb the area.  The physician will then insert a special needle.  Sometimes images are taken of the needle after it is inserted to ensure the needle is in the correct area to be biopsied.   A sample is obtained and sent to the laboratory for study.  Occasionally the laboratory is unable to make a diagnosis from the sample and the procedure may need to be repeated.  Within a week the radiologist will send a report to your physician.  A pathologist will also examine the tissue and send a report.    Risks of the procedure include but are not limited to:   *  Bleeding    *  Infection   *  Puncture of surrounding organs *  Death     *  Lung collapse if the biopsy is near the chest which may require insertion of a      chest tube to re-inflate the lung if severe.    Benefits of the procedure:  Using x-ray helps to locate the area that requires a biopsy. The procedure is less invasive than a surgical procedure, there are no large incisions and it does not require anesthesia.    Alternatives to the procedure:  A biopsy can be performed surgically.  Risks of a surgical biopsy include exposure to anesthesia, infection, excessive bleeding and injury to abdominal organs.  A benefit of surgical biopsy is the ability to see the area to be biopsied and remove of a larger piece of tissue.    TPost Procedure:    *  Expect the biopsy site may be tender up to one week.    *  Rest today (no pushing pulling or straining).   *  Slowly increase activity tomorrow.    *  If you received sedation do not drive for 24 hours.   *  Keep dressing clean and dry.   *  Leave dressing on puncture  site for 24 hours.    *  You may shower when dressing removed.  Call your doctor if experiencing:   *  Signs of infection such as redness, swelling, excessive pain and / or foul        smelling drainage from the puncture site.   *  Chills or fever over 101 degrees (by mouth).   *  Unrelieved pain.   *  Any new or severe symptoms.   *  If experiencing sudden / severe shortness of breath or chest pain go to the       nearest emergency room.   Following the procedure:     Follow-up with the ordering physician as directed.    Continue to take other medications as directed by your physician unless    otherwise instructed.   If applicable, resume taking your blood thinners or Aspirin on ___________.    If you have any concerns please call the Radiology Nurses Desk at [phone]. 7AM-10PM   You are the most important factor in your recovery.  Follow the above instructions carefully.

## 2024-05-28 NOTE — POST-PROCEDURE NOTE
POST PROCEDURE NOTE    Procedure: BM biopsy    Pre-Procedure Diagnosis: r/o systemic mastocytosis     Post-procedure Diagnosis: same    Findings: successful bx    Complications: none    Blood loss: min    Specimen Removed: aspirate&core    Disposition:   Discharge home

## 2024-05-29 ENCOUNTER — TELEPHONE (OUTPATIENT)
Dept: INTERVENTIONAL RADIOLOGY/VASCULAR | Facility: HOSPITAL | Age: 39
End: 2024-05-29
Payer: COMMERCIAL

## 2024-05-30 LAB
BLOOD OR BONE MARROW RESULT: NORMAL
Lab: NORMAL
REF LAB TEST METHOD: NORMAL

## 2024-06-04 LAB — CYTOGENETICS RESULT: NORMAL

## 2024-06-16 NOTE — PROGRESS NOTES
Follow-up      6/17/2024, Interval History:   Since last clinic visit, she underwent bone marrow biopsy.  She is here to review the results.  She is accompanied by her mother.  She reports doing okay.  No new symptoms or complaints.    HISTORY OF PRESENT ILLNESS:  The patient is a 39 y.o. year old female who has been referred to our clinic for further evaluation of possible mastocytosis.  Dr. Estephanie Muir, dermatologist had recommended checking tryptase level for evaluation of her rash.  Tryptase level elevated at 26.9, and so referred to our clinic for further evaluation and management    Family history - paternal grandfather in his 70s lung cancer (ex smoker quit decades before diagnosis), paternal great aunt with cancer  to bones (type unknown) in her 70s. No h/o mastocytosis, blood cancers. Maternal grandmother and some maternal cousins with rosacea    I have reviewed and confirmed the accuracy of the patient's history: Chief complaint, HPI, ROS, Subjective, and Past Family Social History as documented in this note. Charis Joshua MD     REVIEW OF SYSTEMS:  See interval history           Vitals:    06/17/24 1314   BP: 139/89   Pulse: 72   Resp: 20   Temp: 97.8 °F (36.6 °C)   SpO2: 98%   Weight: 118 kg (260 lb)   PainSc: 0-No pain           5/20/2024     2:42 PM   Current Status   ECOG score 0      PHYSICAL EXAM:    CONSTITUTIONAL:  Vital signs reviewed.  No distress, looks comfortable.  RESPIRATORY:  Normal respiratory effort.  Lungs clear to auscultation bilaterally.  CARDIOVASCULAR:  Normal S1, S2.  No murmurs rubs or gallops.  No significant lower extremity edema.  GASTROINTESTINAL: Abdomen appears unremarkable.  Nontender.  No hepatomegaly.  No splenomegaly.  MUSCULOSKELETAL:  Unremarkable digits/nails.  No cyanosis or clubbing.  No apparent joint deformities.  SKIN: Macular erythematous/telangiectatic rash noted over bilateral arms, upper chest.    PSYCHIATRIC:  Normal judgment and insight.  Normal  mood and affect.     I have reexamined the patient and the results are consistent with the previously documented exam. Charis Joshua MD       RECENT LABS:        WBC   Date Value Ref Range Status   06/17/2024 7.80 3.40 - 10.80 10*3/mm3 Final   05/28/2024 11.38 (H) 3.40 - 10.80 10*3/mm3 Final   05/20/2024 11.31 (H) 3.40 - 10.80 10*3/mm3 Final   03/25/2024 11.83 (H) 3.40 - 10.80 10*3/mm3 Final   01/16/2023 7.49 3.40 - 10.80 10*3/mm3 Final   04/18/2014 8.07 3.50 - 10.80 K/mcL Final     Hemoglobin   Date Value Ref Range Status   06/17/2024 14.2 12.0 - 15.9 g/dL Final   05/28/2024 14.5 12.0 - 15.9 g/dL Final   05/20/2024 13.9 12.0 - 15.9 g/dL Final   03/25/2024 13.9 12.0 - 15.9 g/dL Final   01/16/2023 14.4 12.0 - 15.9 g/dL Final   04/18/2014 13.3 11.5 - 15.5 g/dL Final     Platelets   Date Value Ref Range Status   06/17/2024 231 140 - 450 10*3/mm3 Final   05/28/2024 344 140 - 450 10*3/mm3 Final   05/20/2024 286 140 - 450 10*3/mm3 Final   03/25/2024 349 140 - 450 10*3/mm3 Final   01/16/2023 327 140 - 450 10*3/mm3 Final   04/18/2014 317 150 - 450 K/mcL Final           Pathology  05/28/2024 BM bx and aspirate  Comprehensive Hematopathology Evaluation ([facility]) (05/28/2024 13:50)  Flow Cytometry (05/28/2024 13:50)  Blood or Bone Marrow Morphology (05/28/2024 13:50)  Cytogenetics ([facility]) (05/28/2024 13:50)      Assessment & Plan     *Systemic mastocytosis, 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  Several small mast cell lesions within core biopsy sample, in addition interstitial mast cell and paratrabecular mast cells increased.  Aberrant expression of CD25 and CD30 on mast cells.  Significant proportion of mast cells are spindled cells.  Flow cytometry reveals small proportion of mast cells with aberrant CD25 and CD30 expression.  Normal female karyotype.  KIT D816V mutation, quantitative value 0.21. NGS unremarkable  We have  reviewed diagnostic criteria for systemic mastocytosis.  We also reviewed signs and symptoms of mast cell activation such as anaphylaxis, fatigue, lightheadedness, flushing, pruritus, hives with or without angioedema, GI symptoms such as gastric distress, diarrhea, nausea, vomiting, bloating, GERD, neuropsychiatric symptoms such as headaches, brain fog, cognitive dysfunction, anxiety/depression, memory problems; cardiovascular symptoms such as palpitation, chest pain, low blood pressure; pulmonary symptoms such as wheezing and shortness of breath; musculoskeletal symptoms suggestive bone/muscle pain, osteoporosis, focal bone pain; nasal congestion/itching, throat swelling/itching  Potential triggers for mast cell activation such as heat, cold or sudden temperature changes, sunlight, natural and chemical odors, food or beverages including alcohol, insect stings, infractions, stress including emotional physical or environmental; lack of sleep/sleep deprivation, exercise, drugs (such as opioids, NSAIDs, some antibiotics example vancomycin, quinolones, some general and local anesthetics; contrast dyes), vaccinations, mechanical irritation, friction, vibration, surgery, procedures such as endoscopy and colonoscopy  We have reviewed anti- drug therapy approaches for mast cell activation symptoms suggest avoidance of triggers including specific foods, medication, allergens, general triggers; physical measures such as avoiding sudden change in temperature, avoiding extreme temperatures in the bath/shower, swimming pool or air conditioner, avoiding dryness of skin, avoiding rubbing.    Risk of anaphylaxis is higher in patient with systemic mastocytosis relative to the general population, however anesthesia is not contraindicated.  Multidisciplinary approach would be recommended.  Temperature extremes and unnecessary trauma needs to be avoided in the operating room.  Preanesthetic treatment including anxiolytic  agents, antihistamines and possibly corticosteroids would be helpful in reducing the frequency and/or severity of mast cell activation events.  Patient to be avoided include muscle relaxant atracurium and mivacurium (rocuronium and vecuronium may be safer) and succinylcholine.  Caution with opioids, however, analgesic did not be withheld since pain itself can trigger mast cell activation.  Importance of getting 2 epinephrine autoinjectors reemphasized.  Educated on the use of H1 blocker 1 hour before receiving a vaccine.  Following vaccination, patient needs to be observed for at least 60 minutes      Plan:  -Will order ultrasound abdomen to evaluate for hepatosplenomegaly; DEXA scan to evaluate possible osteopenia/osteoporosis; and bone survey for lytic lesion evaluation.  - Referral to [facility] for second opinion.   -Prescription for EpiPen sent to her preferred pharmacy    Return to clinic in 3 months with CBC  Will call with results of imaging      I spent 45 total minutes, face-to-face, caring for Marly today. Greater than 50% of this time involved counseling and/or coordination of care as documented within this note.

## 2024-06-17 ENCOUNTER — LAB (OUTPATIENT)
Dept: LAB | Facility: HOSPITAL | Age: 39
End: 2024-06-17
Payer: COMMERCIAL

## 2024-06-17 ENCOUNTER — OFFICE VISIT (OUTPATIENT)
Dept: ONCOLOGY | Facility: CLINIC | Age: 39
End: 2024-06-17
Payer: COMMERCIAL

## 2024-06-17 VITALS
TEMPERATURE: 97.8 F | RESPIRATION RATE: 20 BRPM | DIASTOLIC BLOOD PRESSURE: 89 MMHG | WEIGHT: 260 LBS | BODY MASS INDEX: 46.8 KG/M2 | HEART RATE: 72 BPM | SYSTOLIC BLOOD PRESSURE: 139 MMHG | OXYGEN SATURATION: 98 %

## 2024-06-17 DIAGNOSIS — D72.9 NEUTROPHILIC LEUKOCYTOSIS: ICD-10-CM

## 2024-06-17 DIAGNOSIS — D47.02 SYSTEMIC MASTOCYTOSIS: Primary | ICD-10-CM

## 2024-06-17 LAB
BASOPHILS # BLD AUTO: 0.02 10*3/MM3 (ref 0–0.2)
BASOPHILS NFR BLD AUTO: 0.3 % (ref 0–1.5)
DEPRECATED RDW RBC AUTO: 41.1 FL (ref 37–54)
EOSINOPHIL # BLD AUTO: 0.15 10*3/MM3 (ref 0–0.4)
EOSINOPHIL NFR BLD AUTO: 1.9 % (ref 0.3–6.2)
ERYTHROCYTE [DISTWIDTH] IN BLOOD BY AUTOMATED COUNT: 11.9 % (ref 12.3–15.4)
HCT VFR BLD AUTO: 40.8 % (ref 34–46.6)
HGB BLD-MCNC: 14.2 G/DL (ref 12–15.9)
IMM GRANULOCYTES # BLD AUTO: 0.06 10*3/MM3 (ref 0–0.05)
IMM GRANULOCYTES NFR BLD AUTO: 0.8 % (ref 0–0.5)
LYMPHOCYTES # BLD AUTO: 2.51 10*3/MM3 (ref 0.7–3.1)
LYMPHOCYTES NFR BLD AUTO: 32.2 % (ref 19.6–45.3)
MCH RBC QN AUTO: 32.5 PG (ref 26.6–33)
MCHC RBC AUTO-ENTMCNC: 34.8 G/DL (ref 31.5–35.7)
MCV RBC AUTO: 93.4 FL (ref 79–97)
MONOCYTES # BLD AUTO: 0.62 10*3/MM3 (ref 0.1–0.9)
MONOCYTES NFR BLD AUTO: 7.9 % (ref 5–12)
NEUTROPHILS NFR BLD AUTO: 4.44 10*3/MM3 (ref 1.7–7)
NEUTROPHILS NFR BLD AUTO: 56.9 % (ref 42.7–76)
NRBC BLD AUTO-RTO: 0 /100 WBC (ref 0–0.2)
PLATELET # BLD AUTO: 231 10*3/MM3 (ref 140–450)
PMV BLD AUTO: 10.3 FL (ref 6–12)
RBC # BLD AUTO: 4.37 10*6/MM3 (ref 3.77–5.28)
WBC NRBC COR # BLD AUTO: 7.8 10*3/MM3 (ref 3.4–10.8)

## 2024-06-17 PROCEDURE — 85025 COMPLETE CBC W/AUTO DIFF WBC: CPT

## 2024-06-17 PROCEDURE — 36415 COLL VENOUS BLD VENIPUNCTURE: CPT

## 2024-06-17 PROCEDURE — 99215 OFFICE O/P EST HI 40 MIN: CPT | Performed by: STUDENT IN AN ORGANIZED HEALTH CARE EDUCATION/TRAINING PROGRAM

## 2024-06-17 RX ORDER — EPINEPHRINE 0.3 MG/.3ML
0.3 INJECTION SUBCUTANEOUS ONCE
Qty: 2 EACH | Refills: 11 | Status: SHIPPED | OUTPATIENT
Start: 2024-06-17 | End: 2024-06-17

## 2024-06-17 NOTE — PATIENT INSTRUCTIONS
Potential triggers for mast cell activation such as heat, cold or sudden temperature changes, sunlight, natural and chemical odors, food or beverages including alcohol, insect stings, infractions, stress including emotional physical or environmental; lack of sleep/sleep deprivation, exercise, drugs (such as opioids, NSAIDs, some antibiotics example vancomycin, quinolones, some general and local anesthetics; contrast dyes), vaccinations, mechanical irritation, friction, vibration, surgery, procedures such as endoscopy and colonoscopy    We suggest avoidance of triggers including specific foods, medication, allergens, general triggers; physical measures such as avoiding sudden change in temperature, avoiding extreme temperatures in the bath/shower, swimming pool or air conditioner, avoiding dryness of skin, avoiding rubbing.      Carry 2 epinephrine autoinjectors at all times.  Recommend using of H1 blocker 1 hour before receiving a vaccine.  Following vaccination, patient needs to be observed for at least 60 minutes

## 2024-06-18 LAB — Lab: NORMAL

## 2024-06-21 LAB — INTELLIGEN MYELOID RESULT: NORMAL

## 2024-06-23 LAB
CCV RESULT: NORMAL
REF LAB TEST METHOD: NORMAL

## 2024-06-24 ENCOUNTER — HOSPITAL ENCOUNTER (OUTPATIENT)
Dept: BONE DENSITY | Facility: HOSPITAL | Age: 39
Discharge: HOME | End: 2024-06-24
Payer: COMMERCIAL

## 2024-06-24 ENCOUNTER — HOSPITAL ENCOUNTER (OUTPATIENT)
Dept: GENERAL RADIOLOGY | Facility: HOSPITAL | Age: 39
Discharge: HOME | End: 2024-06-24
Payer: COMMERCIAL

## 2024-06-24 ENCOUNTER — HOSPITAL ENCOUNTER (OUTPATIENT)
Dept: ULTRASOUND IMAGING | Facility: HOSPITAL | Age: 39
Discharge: HOME | End: 2024-06-24
Payer: COMMERCIAL

## 2024-06-24 DIAGNOSIS — D47.02 SYSTEMIC MASTOCYTOSIS: ICD-10-CM

## 2024-06-24 PROCEDURE — 76700 US EXAM ABDOM COMPLETE: CPT

## 2024-06-24 PROCEDURE — 77075 RADEX OSSEOUS SURVEY COMPL: CPT

## 2024-06-24 PROCEDURE — 77080 DXA BONE DENSITY AXIAL: CPT

## 2024-06-27 ENCOUNTER — TELEPHONE (OUTPATIENT)
Dept: ONCOLOGY | Facility: CLINIC | Age: 39
End: 2024-06-27
Payer: COMMERCIAL

## 2024-06-27 NOTE — TELEPHONE ENCOUNTER
Call to Ms. Snyder to let her know the bone survey was good, no bone lesions noted, and the dexa scan results were also normal.  Patient verbalized understanding and appreciation for the call.

## 2024-07-16 ENCOUNTER — TELEPHONE (OUTPATIENT)
Dept: ONCOLOGY | Facility: CLINIC | Age: 39
End: 2024-07-16

## 2024-07-16 NOTE — TELEPHONE ENCOUNTER
Caller: [facility]    Relationship:     Best call back number: [phone]    What form or medical record are you requesting: MYEL[employer] - PLEASE INCLUDE ATTACHMENT AS THEY CANNOT SEE IT IN CARE EVERYWHERE & [facility]     Who is requesting this form or medical record from you: [facility] WHO WE REFERRED PT TO.    How would you like to receive the form or medical records (pick-up, mail, fax): FAX   If fax, what is the fax number: [phone]    Timeframe paperwork needed: ASAP

## 2024-08-26 NOTE — TELEPHONE ENCOUNTER
Rx Refill Note  Requested Prescriptions     Pending Prescriptions Disp Refills    vitamin D (ERGOCALCIFEROL) 1.25 MG (00868 UT) capsule capsule 24 capsule 1     Sig: Take 1 capsule by mouth 2 (Two) Times a Week.      Last office visit with prescribing clinician: Visit date not found     Next office visit with prescribing clinician: Visit date not found                           Cathie Chua MA  08/26/24, 14:26 EDT

## 2024-08-27 RX ORDER — ERGOCALCIFEROL 1.25 MG/1
50000 CAPSULE, LIQUID FILLED ORAL 2 TIMES WEEKLY
Qty: 24 CAPSULE | Refills: 1 | Status: SHIPPED | OUTPATIENT
Start: 2024-08-29

## 2024-09-17 ENCOUNTER — LAB (OUTPATIENT)
Dept: LAB | Facility: HOSPITAL | Age: 39
End: 2024-09-17
Payer: COMMERCIAL

## 2024-09-17 ENCOUNTER — OFFICE VISIT (OUTPATIENT)
Dept: ONCOLOGY | Facility: CLINIC | Age: 39
End: 2024-09-17
Payer: COMMERCIAL

## 2024-09-17 VITALS
RESPIRATION RATE: 19 BRPM | DIASTOLIC BLOOD PRESSURE: 92 MMHG | SYSTOLIC BLOOD PRESSURE: 145 MMHG | HEART RATE: 92 BPM | WEIGHT: 260 LBS | OXYGEN SATURATION: 98 % | BODY MASS INDEX: 46.07 KG/M2 | HEIGHT: 63 IN

## 2024-09-17 DIAGNOSIS — D47.02 SYSTEMIC MASTOCYTOSIS: ICD-10-CM

## 2024-09-17 DIAGNOSIS — R74.8 ELEVATED SERUM TRYPTASE: ICD-10-CM

## 2024-09-17 DIAGNOSIS — R74.8 ELEVATED SERUM TRYPTASE: Primary | ICD-10-CM

## 2024-09-17 LAB
BASOPHILS # BLD AUTO: 0.03 10*3/MM3 (ref 0–0.2)
BASOPHILS NFR BLD AUTO: 0.3 % (ref 0–1.5)
DEPRECATED RDW RBC AUTO: 39.4 FL (ref 37–54)
EOSINOPHIL # BLD AUTO: 0.17 10*3/MM3 (ref 0–0.4)
EOSINOPHIL NFR BLD AUTO: 1.9 % (ref 0.3–6.2)
ERYTHROCYTE [DISTWIDTH] IN BLOOD BY AUTOMATED COUNT: 11.4 % (ref 12.3–15.4)
HCT VFR BLD AUTO: 41.6 % (ref 34–46.6)
HGB BLD-MCNC: 14.2 G/DL (ref 12–15.9)
IMM GRANULOCYTES # BLD AUTO: 0.02 10*3/MM3 (ref 0–0.05)
IMM GRANULOCYTES NFR BLD AUTO: 0.2 % (ref 0–0.5)
LYMPHOCYTES # BLD AUTO: 3.05 10*3/MM3 (ref 0.7–3.1)
LYMPHOCYTES NFR BLD AUTO: 34.3 % (ref 19.6–45.3)
MCH RBC QN AUTO: 32.1 PG (ref 26.6–33)
MCHC RBC AUTO-ENTMCNC: 34.1 G/DL (ref 31.5–35.7)
MCV RBC AUTO: 93.9 FL (ref 79–97)
MONOCYTES # BLD AUTO: 0.59 10*3/MM3 (ref 0.1–0.9)
MONOCYTES NFR BLD AUTO: 6.6 % (ref 5–12)
NEUTROPHILS NFR BLD AUTO: 5.04 10*3/MM3 (ref 1.7–7)
NEUTROPHILS NFR BLD AUTO: 56.7 % (ref 42.7–76)
NRBC BLD AUTO-RTO: 0 /100 WBC (ref 0–0.2)
PLATELET # BLD AUTO: 371 10*3/MM3 (ref 140–450)
PMV BLD AUTO: 9.4 FL (ref 6–12)
RBC # BLD AUTO: 4.43 10*6/MM3 (ref 3.77–5.28)
WBC NRBC COR # BLD AUTO: 8.9 10*3/MM3 (ref 3.4–10.8)

## 2024-09-17 PROCEDURE — 85025 COMPLETE CBC W/AUTO DIFF WBC: CPT

## 2024-09-17 PROCEDURE — 36415 COLL VENOUS BLD VENIPUNCTURE: CPT

## 2024-09-17 RX ORDER — EPINEPHRINE 0.3 MG/.3ML
0.3 INJECTION SUBCUTANEOUS ONCE
COMMUNITY
Start: 2024-06-17

## 2024-09-20 LAB — TRYPTASE SERPL-MCNC: 20.4 UG/L (ref 2.2–13.2)

## 2024-09-24 RX ORDER — ERGOCALCIFEROL 1.25 MG/1
50000 CAPSULE, LIQUID FILLED ORAL 2 TIMES WEEKLY
Qty: 24 CAPSULE | Refills: 1 | Status: SHIPPED | OUTPATIENT
Start: 2024-09-26

## 2024-10-01 RX ORDER — LEVOTHYROXINE SODIUM 50 UG/1
50 TABLET ORAL DAILY
Qty: 90 TABLET | Refills: 1 | Status: SHIPPED | OUTPATIENT
Start: 2024-10-01

## 2024-10-24 ENCOUNTER — OFFICE VISIT (OUTPATIENT)
Dept: INTERNAL MEDICINE | Facility: CLINIC | Age: 39
End: 2024-10-24
Payer: COMMERCIAL

## 2024-10-24 VITALS
BODY MASS INDEX: 46.21 KG/M2 | HEIGHT: 63 IN | SYSTOLIC BLOOD PRESSURE: 138 MMHG | WEIGHT: 260.8 LBS | DIASTOLIC BLOOD PRESSURE: 97 MMHG | OXYGEN SATURATION: 97 % | HEART RATE: 112 BPM

## 2024-10-24 DIAGNOSIS — Z23 NEED FOR VACCINATION: ICD-10-CM

## 2024-10-24 DIAGNOSIS — E78.2 MIXED HYPERLIPIDEMIA: ICD-10-CM

## 2024-10-24 DIAGNOSIS — N92.6 IRREGULAR MENSTRUAL BLEEDING: ICD-10-CM

## 2024-10-24 DIAGNOSIS — Z23 NEED FOR COVID-19 VACCINE: ICD-10-CM

## 2024-10-24 DIAGNOSIS — I10 PRIMARY HYPERTENSION: Primary | ICD-10-CM

## 2024-10-24 DIAGNOSIS — E03.9 HYPOTHYROIDISM, UNSPECIFIED TYPE: ICD-10-CM

## 2024-10-24 PROCEDURE — 90480 ADMN SARSCOV2 VAC 1/ONLY CMP: CPT | Performed by: STUDENT IN AN ORGANIZED HEALTH CARE EDUCATION/TRAINING PROGRAM

## 2024-10-24 PROCEDURE — 90656 IIV3 VACC NO PRSV 0.5 ML IM: CPT | Performed by: STUDENT IN AN ORGANIZED HEALTH CARE EDUCATION/TRAINING PROGRAM

## 2024-10-24 PROCEDURE — 91320 SARSCV2 VAC 30MCG TRS-SUC IM: CPT | Performed by: STUDENT IN AN ORGANIZED HEALTH CARE EDUCATION/TRAINING PROGRAM

## 2024-10-24 PROCEDURE — 90471 IMMUNIZATION ADMIN: CPT | Performed by: STUDENT IN AN ORGANIZED HEALTH CARE EDUCATION/TRAINING PROGRAM

## 2024-10-24 PROCEDURE — 99214 OFFICE O/P EST MOD 30 MIN: CPT | Performed by: STUDENT IN AN ORGANIZED HEALTH CARE EDUCATION/TRAINING PROGRAM

## 2024-10-24 RX ORDER — AMLODIPINE BESYLATE 5 MG/1
5 TABLET ORAL DAILY
Qty: 90 TABLET | Refills: 0 | Status: SHIPPED | OUTPATIENT
Start: 2024-10-24

## 2024-10-24 RX ORDER — AMLODIPINE BESYLATE 5 MG/1
5 TABLET ORAL DAILY
Qty: 90 TABLET | Refills: 0 | Status: SHIPPED | OUTPATIENT
Start: 2024-10-24 | End: 2024-10-24

## 2024-10-24 RX ORDER — VALSARTAN 40 MG/1
40 TABLET ORAL DAILY
Qty: 30 TABLET | Refills: 0 | Status: SHIPPED | OUTPATIENT
Start: 2024-10-24 | End: 2024-10-24

## 2024-10-24 NOTE — PROGRESS NOTES
Shahram Leung M.D.  Internal Medicine  [facility]  [address], [address]  [place] [address]  [phone]      Chief Complaint  Establish Care and Hypertension (Discuss Blood Pressure /)    SUBJECTIVE    History of Present Illness    Marly Snyder is a 39 y.o. female who presents to the office today as a new patient to establish care.     She is concerned about hypertension since March. BP 120s-140s/90s. She bought a BP cuff. Anxiety contributes to HTN. Initially thought it was anxiety. we  She is on spironolactone 50 mg daily for acne.  Blood pressure at her hematology appointment last month was 145/92. Denies chest pain, headaches, legs swelling. Some mild muscle pain. No NSAIDs or decongestant use.     She had a rash and her dermatologist recommended checking tryptase level.  She has been following with Dr. Joshua for possible mastocytosis.  She has elevated serum tryptase.  She is taking an antihistamine for this.    She has an IUD. Having light bleeding for 2-3 days monthly or every 2 weeks. Not on birth control. Kyleena removed last year. Denies possibility of pregnancy. History of early menopause in family.     Hypothyroidism-she follows with endocrinology (Dr. Farfan)    Vitamin D deficiency-on replacement    Hyperlipidemia-managed with diet and lifestyle    Follows with GYN.     Social-Reads and spends tome with friends        Review of Systems    Allergies   Allergen Reactions    Morphine Anaphylaxis    Sulfa Antibiotics Rash        Outpatient Medications Marked as Taking for the 10/24/24 encounter (Office Visit) with Shahram Leung MD   Medication Sig Dispense Refill    amLODIPine (NORVASC) 5 MG tablet Take 1 tablet by mouth Daily. 90 tablet 0    ammonium lactate (LAC-HYDRIN) 12 % lotion Apply  topically to the appropriate area as directed 2 (Two) Times a Day. to affected area(s) 225 g 1    EPINEPHrine (EPIPEN) 0.3 MG/0.3ML solution auto-injector injection Inject 0.3 mL under  "the skin into the appropriate area as directed 1 (One) Time.      levothyroxine (SYNTHROID, LEVOTHROID) 50 MCG tablet Take 1 tablet by mouth Daily. 90 tablet 1    spironolactone (ALDACTONE) 50 MG tablet Take 2 tablets by mouth Daily.      vitamin D (ERGOCALCIFEROL) 1.25 MG (62173 UT) capsule capsule Take 1 capsule by mouth 2 (Two) Times a Week. 24 capsule 1        Past Medical History:   Diagnosis Date    Hx of reduction of closed fracture     left arm    Hypothyroidism 2011     Past Surgical History:   Procedure Laterality Date    ADENOIDECTOMY  1996    BONE MARROW BIOPSY      TONSILLECTOMY AND ADENOIDECTOMY       Family History   Problem Relation Age of Onset    Hypertension Father 40    Hyperlipidemia Father     Coronary artery disease Father     Cancer Paternal Aunt         Great Aunt - Bone    Anxiety disorder Maternal Grandmother     Arthritis Maternal Grandmother     Anxiety disorder Paternal Grandmother     Depression Paternal Grandmother     Hyperlipidemia Paternal Grandmother     Cancer Paternal Grandfather         lung    Breast cancer Other     reports that she has never smoked. She has never been exposed to tobacco smoke. She has never used smokeless tobacco. She reports current alcohol use of about 3.0 standard drinks of alcohol per week. She reports that she does not use drugs.    OBJECTIVE    Vital Signs:   /97   Pulse 112   Ht 158.8 cm (62.52\")   Wt 118 kg (260 lb 12.8 oz)   SpO2 97%   BMI 46.91 kg/m²     Physical Exam  Constitutional:       Appearance: Normal appearance. She is normal weight.   Cardiovascular:      Rate and Rhythm: Normal rate and regular rhythm.      Heart sounds: Normal heart sounds. No murmur heard.  Pulmonary:      Effort: Pulmonary effort is normal.      Breath sounds: Normal breath sounds.   Abdominal:      General: Abdomen is flat. There is no distension.      Palpations: Abdomen is soft.      Tenderness: There is no abdominal tenderness.   Skin:     General: Skin " is warm and dry.   Neurological:      Mental Status: She is alert.   Psychiatric:         Mood and Affect: Mood normal.         Behavior: Behavior normal.         Thought Content: Thought content normal.            The following data was reviewed by: Shahram Leung MD on 10/24/2024:  CMP          3/25/2024    12:24   CMP   Glucose 80    BUN 17    Creatinine 0.85    EGFR 90.6    Sodium 138    Potassium 3.8    Chloride 102    Calcium 9.5    Total Protein 6.6    Albumin 4.2    Globulin 2.4    Total Bilirubin 0.3    Alkaline Phosphatase 81    AST (SGOT) 13    ALT (SGPT) 15    Albumin/Globulin Ratio 1.8    BUN/Creatinine Ratio 20.0    Anion Gap 16.1      CBC w/diff          5/28/2024    11:31 6/17/2024    13:07 9/17/2024    11:44   CBC w/Diff   WBC 11.38  7.80  8.90    RBC 4.56  4.37  4.43    Hemoglobin 14.5  14.2  14.2    Hematocrit 44.5  40.8  41.6    MCV 97.6  93.4  93.9    MCH 31.8  32.5  32.1    MCHC 32.6  34.8  34.1    RDW 13.1  11.9  11.4    Platelets 344  231  371    Neutrophil Rel % 68.7  56.9  56.7    Immature Granulocyte Rel % 0.5  0.8  0.2    Lymphocyte Rel % 23.3  32.2  34.3    Monocyte Rel % 6.3  7.9  6.6    Eosinophil Rel % 1.0  1.9  1.9    Basophil Rel % 0.2  0.3  0.3      Lipid Panel          3/25/2024    12:24   Lipid Panel   Total Cholesterol 220    Triglycerides 115    HDL Cholesterol 50    VLDL Cholesterol 21    LDL Cholesterol  149    LDL/HDL Ratio 2.94      TSH          3/25/2024    12:24   TSH   TSH 2.490      A1C Last 3 Results          3/25/2024    12:24   HGBA1C Last 3 Results   Hemoglobin A1C 5.50      Data reviewed : Recent hematology and endocrinology note           ASSESSMENT & PLAN     39-year-old with recent diagnosis of mastocytosis here to establish care.  Blood pressure has been elevated at recent clinic appointments and is elevated today.  Her home readings also show elevated blood pressure.  Discussed monitoring blood pressure at home and bring log to next appointment.  Will start  antihypertensive today.  Side effects discussed.  Follow-up in 1 month.    Diagnoses and all orders for this visit:    1. Primary hypertension (Primary)  -     CBC auto differential  -     Follicle stimulating hormone  -     Luteinizing hormone  -     Prolactin  -     TSH  -     amLODIPine (NORVASC) 5 MG tablet; Take 1 tablet by mouth Daily.  Dispense: 90 tablet; Refill: 0    2. Mixed hyperlipidemia    3. Hypothyroidism, unspecified type  -     CBC auto differential  -     Follicle stimulating hormone  -     Luteinizing hormone  -     Prolactin  -     TSH    4. Irregular menstrual bleeding  -     CBC auto differential  -     Follicle stimulating hormone  -     Luteinizing hormone  -     Prolactin  -     TSH    5. Need for vaccination  -     Discontinue: Influenza Virus Vaccine Split (FLUZONE) injection; Inject 0.5 mL into the appropriate muscle as directed by prescriber 1 (One) Time for 1 dose.  Dispense: 0.5 mL; Refill: 0  -     Fluzone >6mos ([id number])    6. Need for COVID-19 vaccine  -     COVID-19 (Pfizer) 12yrs+ (COMIRNATY)              Health Maintenance Due   Topic Date Due    BMI FOLLOWUP  Never done    TDAP/TD VACCINES (1 - Tdap) Never done    HEPATITIS C SCREENING  Never done    ANNUAL PHYSICAL  12/28/2017    PAP SMEAR  10/17/2021        Follow Up  Return in about 4 weeks (around 11/21/2024).    Patient/family had no further questions at this time and verbalized understanding of the plan discussed today.

## 2024-10-25 LAB
BASOPHILS # BLD AUTO: 0.01 10*3/MM3 (ref 0–0.2)
BASOPHILS NFR BLD AUTO: 0.1 % (ref 0–1.5)
EOSINOPHIL # BLD AUTO: 0.16 10*3/MM3 (ref 0–0.4)
EOSINOPHIL NFR BLD AUTO: 1.8 % (ref 0.3–6.2)
ERYTHROCYTE [DISTWIDTH] IN BLOOD BY AUTOMATED COUNT: 11.6 % (ref 12.3–15.4)
FSH SERPL-ACNC: 9.3 MIU/ML
HCT VFR BLD AUTO: 41.3 % (ref 34–46.6)
HGB BLD-MCNC: 13.9 G/DL (ref 12–15.9)
IMM GRANULOCYTES # BLD AUTO: 0.03 10*3/MM3 (ref 0–0.05)
IMM GRANULOCYTES NFR BLD AUTO: 0.3 % (ref 0–0.5)
LH SERPL-ACNC: 7.1 MIU/ML
LYMPHOCYTES # BLD AUTO: 3.1 10*3/MM3 (ref 0.7–3.1)
LYMPHOCYTES NFR BLD AUTO: 34.8 % (ref 19.6–45.3)
MCH RBC QN AUTO: 31.4 PG (ref 26.6–33)
MCHC RBC AUTO-ENTMCNC: 33.7 G/DL (ref 31.5–35.7)
MCV RBC AUTO: 93.2 FL (ref 79–97)
MONOCYTES # BLD AUTO: 0.65 10*3/MM3 (ref 0.1–0.9)
MONOCYTES NFR BLD AUTO: 7.3 % (ref 5–12)
NEUTROPHILS # BLD AUTO: 4.97 10*3/MM3 (ref 1.7–7)
NEUTROPHILS NFR BLD AUTO: 55.7 % (ref 42.7–76)
NRBC BLD AUTO-RTO: 0 /100 WBC (ref 0–0.2)
PLATELET # BLD AUTO: 375 10*3/MM3 (ref 140–450)
PROLACTIN SERPL-MCNC: 15.2 NG/ML (ref 4.8–33.4)
RBC # BLD AUTO: 4.43 10*6/MM3 (ref 3.77–5.28)
TSH SERPL DL<=0.005 MIU/L-ACNC: 1.88 UIU/ML (ref 0.27–4.2)
WBC # BLD AUTO: 8.92 10*3/MM3 (ref 3.4–10.8)

## 2024-11-21 ENCOUNTER — PATIENT MESSAGE (OUTPATIENT)
Dept: INTERNAL MEDICINE | Facility: CLINIC | Age: 39
End: 2024-11-21

## 2024-11-21 ENCOUNTER — OFFICE VISIT (OUTPATIENT)
Dept: INTERNAL MEDICINE | Facility: CLINIC | Age: 39
End: 2024-11-21
Payer: COMMERCIAL

## 2024-11-21 VITALS
WEIGHT: 262 LBS | HEIGHT: 63 IN | OXYGEN SATURATION: 99 % | SYSTOLIC BLOOD PRESSURE: 130 MMHG | DIASTOLIC BLOOD PRESSURE: 87 MMHG | BODY MASS INDEX: 46.42 KG/M2 | HEART RATE: 99 BPM

## 2024-11-21 DIAGNOSIS — I10 PRIMARY HYPERTENSION: Primary | ICD-10-CM

## 2024-11-21 PROCEDURE — 99213 OFFICE O/P EST LOW 20 MIN: CPT | Performed by: STUDENT IN AN ORGANIZED HEALTH CARE EDUCATION/TRAINING PROGRAM

## 2024-11-21 RX ORDER — HYDROCHLOROTHIAZIDE 12.5 MG/1
12.5 TABLET ORAL DAILY
Qty: 90 TABLET | Refills: 0 | Status: SHIPPED | OUTPATIENT
Start: 2024-11-21

## 2024-11-21 NOTE — PROGRESS NOTES
Shahram Leung M.D.  Internal Medicine  [facility]  [address], [address]  [place] [address]  [phone]      Chief Complaint  Hypertension ( 4 wk Follow Up /)    SUBJECTIVE    History of Present Illness    Marly Snyder is a 39 y.o. female with recent diagnosis of mastocytosis and hypertension who presents to the office today as an established patient that last saw me on 10/24/2024.     Here for hypertension follow-up.  At last appointment she was started on amlodipine. Had ankle swelling at events since starting amlodipine.  This is bothersome to her.  She has been monitoring her blood pressure at home.    Review of Systems    Allergies   Allergen Reactions    Morphine Anaphylaxis    Sulfa Antibiotics Rash        Outpatient Medications Marked as Taking for the 11/21/24 encounter (Office Visit) with Shahram Leung MD   Medication Sig Dispense Refill    ammonium lactate (LAC-HYDRIN) 12 % lotion Apply  topically to the appropriate area as directed 2 (Two) Times a Day. to affected area(s) 225 g 1    EPINEPHrine (EPIPEN) 0.3 MG/0.3ML solution auto-injector injection Inject 0.3 mL under the skin into the appropriate area as directed 1 (One) Time.      levothyroxine (SYNTHROID, LEVOTHROID) 50 MCG tablet Take 1 tablet by mouth Daily. 90 tablet 1    spironolactone (ALDACTONE) 50 MG tablet Take 2 tablets by mouth Daily.      vitamin D (ERGOCALCIFEROL) 1.25 MG (47633 UT) capsule capsule Take 1 capsule by mouth 2 (Two) Times a Week. 24 capsule 1    [DISCONTINUED] amLODIPine (NORVASC) 5 MG tablet Take 1 tablet by mouth Daily. 90 tablet 0        Past Medical History:   Diagnosis Date    Hx of reduction of closed fracture     left arm    Hypertension 2024    Hypothyroidism 2011     Past Surgical History:   Procedure Laterality Date    ADENOIDECTOMY  1996    BONE MARROW BIOPSY      TONSILLECTOMY AND ADENOIDECTOMY       Family History   Problem Relation Age of Onset    Hypertension Father 40     "Hyperlipidemia Father     Coronary artery disease Father     Cancer Paternal Aunt         Great Aunt - Bone    Anxiety disorder Maternal Grandmother     Arthritis Maternal Grandmother     Anxiety disorder Paternal Grandmother     Depression Paternal Grandmother     Hyperlipidemia Paternal Grandmother     Cancer Paternal Grandfather         lung    Breast cancer Other     reports that she has never smoked. She has never been exposed to tobacco smoke. She has never used smokeless tobacco. She reports current alcohol use of about 3.0 standard drinks of alcohol per week. She reports that she does not use drugs.    OBJECTIVE    Vital Signs:   /87   Pulse 99   Ht 158.8 cm (62.52\")   Wt 119 kg (262 lb)   SpO2 99%   BMI 47.13 kg/m²     Physical Exam  Constitutional:       Appearance: Normal appearance. She is normal weight.   Cardiovascular:      Rate and Rhythm: Normal rate and regular rhythm.      Heart sounds: Normal heart sounds. No murmur heard.  Pulmonary:      Effort: Pulmonary effort is normal.      Breath sounds: Normal breath sounds.   Skin:     General: Skin is warm and dry.   Neurological:      Mental Status: She is alert.   Psychiatric:         Mood and Affect: Mood normal.         Behavior: Behavior normal.         Thought Content: Thought content normal.            The following data was reviewed by: Shahram Leung MD on 11/21/2024:  CMP          3/25/2024    12:24   CMP   Glucose 80    BUN 17    Creatinine 0.85    EGFR 90.6    Sodium 138    Potassium 3.8    Chloride 102    Calcium 9.5    Total Protein 6.6    Albumin 4.2    Globulin 2.4    Total Bilirubin 0.3    Alkaline Phosphatase 81    AST (SGOT) 13    ALT (SGPT) 15    Albumin/Globulin Ratio 1.8    BUN/Creatinine Ratio 20.0    Anion Gap 16.1      CBC w/diff          6/17/2024    13:07 9/17/2024    11:44 10/24/2024    08:51   CBC w/Diff   WBC 7.80  8.90  8.92    RBC 4.37  4.43  4.43    Hemoglobin 14.2  14.2  13.9    Hematocrit 40.8  41.6  41.3  "   MCV 93.4  93.9  93.2    MCH 32.5  32.1  31.4    MCHC 34.8  34.1  33.7    RDW 11.9  11.4  11.6    Platelets 231  371  375    Neutrophil Rel % 56.9  56.7  55.7    Immature Granulocyte Rel % 0.8  0.2     Lymphocyte Rel % 32.2  34.3  34.8    Monocyte Rel % 7.9  6.6  7.3    Eosinophil Rel % 1.9  1.9  1.8    Basophil Rel % 0.3  0.3  0.1      Lipid Panel          3/25/2024    12:24   Lipid Panel   Total Cholesterol 220    Triglycerides 115    HDL Cholesterol 50    VLDL Cholesterol 21    LDL Cholesterol  149    LDL/HDL Ratio 2.94      TSH          3/25/2024    12:24 10/24/2024    08:51   TSH   TSH 2.490  1.880      A1C Last 3 Results          3/25/2024    12:24   HGBA1C Last 3 Results   Hemoglobin A1C 5.50                  ASSESSMENT & PLAN          Primary hypertension  Hypertension is borderline  Medication changes per orders.  Stop amlodipine.  Start hydrochlorothiazide.  She will continue spironolactone.  Monitor blood pressure at home.  Blood pressure will be reassessedin 4 weeks.                 Health Maintenance Due   Topic Date Due    TDAP/TD VACCINES (1 - Tdap) Never done    HEPATITIS C SCREENING  Never done    ANNUAL PHYSICAL  12/28/2017    PAP SMEAR  10/17/2021        Follow Up  Return in about 4 weeks (around 12/19/2024).    Patient/family had no further questions at this time and verbalized understanding of the plan discussed today.     Answers submitted by the patient for this visit:  Primary Reason for Visit (Submitted on 11/14/2024)  What is the primary reason for your visit?: High Blood Pressure  High Blood Pressure Questionnaire (Submitted on 11/14/2024)  Chief Complaint: Hypertension  Chronicity: recurrent  Onset: 1 to 4 weeks ago  Progression since onset: improved  anxiety: No  blurred vision: No  chest pain: No  headaches: No  malaise/fatigue: No  orthopnea: No  palpitations: No  peripheral edema: No  shortness of breath: No  Agents associated with hypertension: thyroid hormones  CAD risks:  dyslipidemia, family history, obesity, sedentary lifestyle  Compliance problems: exercise

## 2024-11-22 ENCOUNTER — TELEPHONE (OUTPATIENT)
Dept: INTERNAL MEDICINE | Facility: CLINIC | Age: 39
End: 2024-11-22
Payer: COMMERCIAL

## 2024-11-22 RX ORDER — ATENOLOL 25 MG/1
25 TABLET ORAL DAILY
Qty: 90 TABLET | Refills: 0 | Status: SHIPPED | OUTPATIENT
Start: 2024-11-22

## 2024-11-22 NOTE — TELEPHONE ENCOUNTER
I called Marly Snyder at [phone] at 17:30 EST     Discussed in general there is low cross-reactivity between sulfa antibiotics and other sulfa containing drugs however she does have a history of anaphylaxis to morphine and mastocytosis so I think her concern is very reasonable.  Instead she will start atenolol.  Discussed this is not first-line medication for hypertension however she had edema with amlodipine and there would be concern with spironolactone and ACE inhibitor combination.  Discussed fatigue and bradycardia are possible side effects.  Patient voiced understanding.

## 2025-01-03 ENCOUNTER — OFFICE VISIT (OUTPATIENT)
Dept: INTERNAL MEDICINE | Facility: CLINIC | Age: 40
End: 2025-01-03
Payer: COMMERCIAL

## 2025-01-03 VITALS
HEART RATE: 76 BPM | DIASTOLIC BLOOD PRESSURE: 86 MMHG | OXYGEN SATURATION: 96 % | WEIGHT: 265 LBS | HEIGHT: 63 IN | SYSTOLIC BLOOD PRESSURE: 124 MMHG | BODY MASS INDEX: 46.95 KG/M2

## 2025-01-03 DIAGNOSIS — I10 PRIMARY HYPERTENSION: Primary | ICD-10-CM

## 2025-01-03 PROCEDURE — 99213 OFFICE O/P EST LOW 20 MIN: CPT | Performed by: STUDENT IN AN ORGANIZED HEALTH CARE EDUCATION/TRAINING PROGRAM

## 2025-01-03 RX ORDER — ATENOLOL 25 MG/1
25 TABLET ORAL DAILY
Qty: 90 TABLET | Refills: 2 | Status: SHIPPED | OUTPATIENT
Start: 2025-01-03

## 2025-01-03 NOTE — PROGRESS NOTES
Shahram Leung M.D.  Internal Medicine  [facility]  [address], [address]  [place] [address]  [phone]      Chief Complaint  Hypertension (Follow Up /)      Marly Snyder is a 39 y.o. female with mastocytosis and hypertension who presents to the office today as an established patient that last saw me on 11/21/2024.     For hypertension she was initially started on amlodipine.  She had ankle swelling on amlodipine.  Patient was advised to start hydrochlorothiazide but she was concerned because she has sulfa allergy.  She is on spironolactone.  She was started on atenolol instead.    This is a chronic problem. The current episode started more than 1 month ago. The problem has been improved since onset. The problem is controlled. Agents associated with hypertension include thyroid hormones.     Risk factors for coronary artery disease include dyslipidemia, family history, obesity and sedentary lifestyle. Compliance problems include exercise.      Sometimes can feel her heart beat at night but thinks this could be over thinking.     No longer has IUD. She is abstent/uses condoms.     Review of Systems    Allergies   Allergen Reactions    Morphine Anaphylaxis    Sulfa Antibiotics Rash        Outpatient Medications Marked as Taking for the 1/3/25 encounter (Office Visit) with Shahram Leung MD   Medication Sig Dispense Refill    ammonium lactate (LAC-HYDRIN) 12 % lotion Apply  topically to the appropriate area as directed 2 (Two) Times a Day. to affected area(s) 225 g 1    atenolol (TENORMIN) 25 MG tablet Take 1 tablet by mouth Daily. 90 tablet 2    EPINEPHrine (EPIPEN) 0.3 MG/0.3ML solution auto-injector injection Inject 0.3 mL under the skin into the appropriate area as directed 1 (One) Time.      levothyroxine (SYNTHROID, LEVOTHROID) 50 MCG tablet Take 1 tablet by mouth Daily. 90 tablet 1    spironolactone (ALDACTONE) 50 MG tablet Take 2 tablets by mouth Daily.      vitamin D  "(ERGOCALCIFEROL) 1.25 MG (83658 UT) capsule capsule Take 1 capsule by mouth 2 (Two) Times a Week. 24 capsule 1    [DISCONTINUED] atenolol (TENORMIN) 25 MG tablet Take 1 tablet by mouth Daily. 90 tablet 0        Past Medical History:   Diagnosis Date    History of medical problems 2024    Systemic mastocytosis    Hx of reduction of closed fracture     left arm    Hypertension 2024    Hypothyroidism 2011     Past Surgical History:   Procedure Laterality Date    ADENOIDECTOMY  1996    BONE MARROW BIOPSY      TONSILLECTOMY AND ADENOIDECTOMY       Family History   Problem Relation Age of Onset    Hypertension Father 40    Hyperlipidemia Father     Coronary artery disease Father     Cancer Paternal Aunt         Great Aunt - Bone    Anxiety disorder Maternal Grandmother     Arthritis Maternal Grandmother     Anxiety disorder Paternal Grandmother     Depression Paternal Grandmother     Hyperlipidemia Paternal Grandmother     Cancer Paternal Grandfather         lung    Breast cancer Other     reports that she has never smoked. She has never been exposed to tobacco smoke. She has never used smokeless tobacco. She reports current alcohol use of about 3.0 standard drinks of alcohol per week. She reports that she does not use drugs.    OBJECTIVE    Vital Signs:   /86   Pulse 76   Ht 158.8 cm (62.52\")   Wt 120 kg (265 lb)   SpO2 96%   BMI 47.67 kg/m²     Physical Exam  Constitutional:       Appearance: Normal appearance.   Cardiovascular:      Rate and Rhythm: Normal rate and regular rhythm.      Heart sounds: Normal heart sounds. No murmur heard.  Pulmonary:      Effort: Pulmonary effort is normal.      Breath sounds: Normal breath sounds.   Musculoskeletal:      Right lower leg: No edema.      Left lower leg: No edema.   Skin:     General: Skin is warm and dry.   Neurological:      Mental Status: She is alert.   Psychiatric:         Mood and Affect: Mood normal.         Behavior: Behavior normal.         Thought " Content: Thought content normal.            The following data was reviewed by: Shahram Leung MD on 01/03/2025:  CMP          3/25/2024    12:24   CMP   Glucose 80    BUN 17    Creatinine 0.85    EGFR 90.6    Sodium 138    Potassium 3.8    Chloride 102    Calcium 9.5    Total Protein 6.6    Albumin 4.2    Globulin 2.4    Total Bilirubin 0.3    Alkaline Phosphatase 81    AST (SGOT) 13    ALT (SGPT) 15    Albumin/Globulin Ratio 1.8    BUN/Creatinine Ratio 20.0    Anion Gap 16.1      CBC w/diff          6/17/2024    13:07 9/17/2024    11:44 10/24/2024    08:51   CBC w/Diff   WBC 7.80  8.90  8.92    RBC 4.37  4.43  4.43    Hemoglobin 14.2  14.2  13.9    Hematocrit 40.8  41.6  41.3    MCV 93.4  93.9  93.2    MCH 32.5  32.1  31.4    MCHC 34.8  34.1  33.7    RDW 11.9  11.4  11.6    Platelets 231  371  375    Neutrophil Rel % 56.9  56.7  55.7    Immature Granulocyte Rel % 0.8  0.2     Lymphocyte Rel % 32.2  34.3  34.8    Monocyte Rel % 7.9  6.6  7.3    Eosinophil Rel % 1.9  1.9  1.8    Basophil Rel % 0.3  0.3  0.1      Lipid Panel          3/25/2024    12:24   Lipid Panel   Total Cholesterol 220    Triglycerides 115    HDL Cholesterol 50    VLDL Cholesterol 21    LDL Cholesterol  149    LDL/HDL Ratio 2.94      TSH          3/25/2024    12:24 10/24/2024    08:51   TSH   TSH 2.490  1.880      Data reviewed : Recent Pap normal              ASSESSMENT & PLAN      Primary hypertension  Hypertension is stable and controlled  Continue current treatment regimen.  Blood pressure will be reassessed in 6 months.    Orders:    atenolol (TENORMIN) 25 MG tablet; Take 1 tablet by mouth Daily.            Health Maintenance Due   Topic Date Due    TDAP/TD VACCINES (1 - Tdap) Never done    HEPATITIS C SCREENING  Never done    ANNUAL PHYSICAL  12/28/2017    PAP SMEAR  10/17/2021        Follow Up  Return in about 6 months (around 7/3/2025) for Annual physical.    Patient/family had no further questions at this time and verbalized understanding  of the plan discussed today.

## 2025-01-07 RX ORDER — LEVOTHYROXINE SODIUM 50 UG/1
50 TABLET ORAL DAILY
Qty: 90 TABLET | Refills: 1 | Status: SHIPPED | OUTPATIENT
Start: 2025-01-07

## 2025-01-14 ENCOUNTER — LAB (OUTPATIENT)
Dept: LAB | Facility: HOSPITAL | Age: 40
End: 2025-01-14
Payer: COMMERCIAL

## 2025-01-14 DIAGNOSIS — R74.8 ELEVATED SERUM TRYPTASE: ICD-10-CM

## 2025-01-14 DIAGNOSIS — D47.02 SYSTEMIC MASTOCYTOSIS: ICD-10-CM

## 2025-01-14 LAB
BASOPHILS # BLD AUTO: 0.01 10*3/MM3 (ref 0–0.2)
BASOPHILS NFR BLD AUTO: 0.1 % (ref 0–1.5)
DEPRECATED RDW RBC AUTO: 40.3 FL (ref 37–54)
EOSINOPHIL # BLD AUTO: 0.18 10*3/MM3 (ref 0–0.4)
EOSINOPHIL NFR BLD AUTO: 1.9 % (ref 0.3–6.2)
ERYTHROCYTE [DISTWIDTH] IN BLOOD BY AUTOMATED COUNT: 11.7 % (ref 12.3–15.4)
HCT VFR BLD AUTO: 39.8 % (ref 34–46.6)
HGB BLD-MCNC: 13.6 G/DL (ref 12–15.9)
IMM GRANULOCYTES # BLD AUTO: 0.03 10*3/MM3 (ref 0–0.05)
IMM GRANULOCYTES NFR BLD AUTO: 0.3 % (ref 0–0.5)
LYMPHOCYTES # BLD AUTO: 3.73 10*3/MM3 (ref 0.7–3.1)
LYMPHOCYTES NFR BLD AUTO: 38.9 % (ref 19.6–45.3)
MCH RBC QN AUTO: 32.2 PG (ref 26.6–33)
MCHC RBC AUTO-ENTMCNC: 34.2 G/DL (ref 31.5–35.7)
MCV RBC AUTO: 94.3 FL (ref 79–97)
MONOCYTES # BLD AUTO: 0.58 10*3/MM3 (ref 0.1–0.9)
MONOCYTES NFR BLD AUTO: 6.1 % (ref 5–12)
NEUTROPHILS NFR BLD AUTO: 5.05 10*3/MM3 (ref 1.7–7)
NEUTROPHILS NFR BLD AUTO: 52.7 % (ref 42.7–76)
NRBC BLD AUTO-RTO: 0 /100 WBC (ref 0–0.2)
PLATELET # BLD AUTO: 326 10*3/MM3 (ref 140–450)
PMV BLD AUTO: 10.6 FL (ref 6–12)
RBC # BLD AUTO: 4.22 10*6/MM3 (ref 3.77–5.28)
WBC NRBC COR # BLD AUTO: 9.58 10*3/MM3 (ref 3.4–10.8)

## 2025-01-14 PROCEDURE — 36415 COLL VENOUS BLD VENIPUNCTURE: CPT

## 2025-01-14 PROCEDURE — 85025 COMPLETE CBC W/AUTO DIFF WBC: CPT

## 2025-01-17 ENCOUNTER — TELEPHONE (OUTPATIENT)
Dept: ONCOLOGY | Facility: CLINIC | Age: 40
End: 2025-01-17
Payer: COMMERCIAL

## 2025-01-17 LAB — TRYPTASE SERPL-MCNC: 21.2 UG/L (ref 2.2–13.2)

## 2025-01-17 NOTE — TELEPHONE ENCOUNTER
Call to Ms. Snyder to let her know her CBC and Tryptase levels are both stable.  Gave her the actual result of the tryptase level.  Patient verbalized understanding and appreciation for the call.

## 2025-01-22 RX ORDER — AMLODIPINE BESYLATE 5 MG/1
5 TABLET ORAL DAILY
Qty: 90 TABLET | Refills: 0 | OUTPATIENT
Start: 2025-01-22

## 2025-04-06 ENCOUNTER — PATIENT MESSAGE (OUTPATIENT)
Dept: INTERNAL MEDICINE | Facility: CLINIC | Age: 40
End: 2025-04-06
Payer: COMMERCIAL

## 2025-04-07 RX ORDER — LEVOTHYROXINE SODIUM 50 UG/1
50 TABLET ORAL DAILY
Qty: 90 TABLET | Refills: 1 | Status: SHIPPED | OUTPATIENT
Start: 2025-04-07

## 2025-05-13 ENCOUNTER — LAB (OUTPATIENT)
Dept: LAB | Facility: HOSPITAL | Age: 40
End: 2025-05-13
Payer: COMMERCIAL

## 2025-05-13 DIAGNOSIS — R74.8 ELEVATED SERUM TRYPTASE: Primary | ICD-10-CM

## 2025-05-13 DIAGNOSIS — D47.02 SYSTEMIC MASTOCYTOSIS: ICD-10-CM

## 2025-05-13 DIAGNOSIS — R79.89 ABNORMAL CBC: ICD-10-CM

## 2025-05-13 DIAGNOSIS — D72.828 NEUTROPHILIC LEUKOCYTOSIS: ICD-10-CM

## 2025-05-13 LAB
BASOPHILS # BLD AUTO: 0.02 10*3/MM3 (ref 0–0.2)
BASOPHILS NFR BLD AUTO: 0.2 % (ref 0–1.5)
DEPRECATED RDW RBC AUTO: 41.5 FL (ref 37–54)
EOSINOPHIL # BLD AUTO: 0.17 10*3/MM3 (ref 0–0.4)
EOSINOPHIL NFR BLD AUTO: 1.8 % (ref 0.3–6.2)
ERYTHROCYTE [DISTWIDTH] IN BLOOD BY AUTOMATED COUNT: 11.8 % (ref 12.3–15.4)
HCT VFR BLD AUTO: 39.7 % (ref 34–46.6)
HGB BLD-MCNC: 13.2 G/DL (ref 12–15.9)
IMM GRANULOCYTES # BLD AUTO: 0.06 10*3/MM3 (ref 0–0.05)
IMM GRANULOCYTES NFR BLD AUTO: 0.6 % (ref 0–0.5)
LYMPHOCYTES # BLD AUTO: 2.6 10*3/MM3 (ref 0.7–3.1)
LYMPHOCYTES NFR BLD AUTO: 26.9 % (ref 19.6–45.3)
MCH RBC QN AUTO: 31.8 PG (ref 26.6–33)
MCHC RBC AUTO-ENTMCNC: 33.2 G/DL (ref 31.5–35.7)
MCV RBC AUTO: 95.7 FL (ref 79–97)
MONOCYTES # BLD AUTO: 0.67 10*3/MM3 (ref 0.1–0.9)
MONOCYTES NFR BLD AUTO: 6.9 % (ref 5–12)
NEUTROPHILS NFR BLD AUTO: 6.14 10*3/MM3 (ref 1.7–7)
NEUTROPHILS NFR BLD AUTO: 63.6 % (ref 42.7–76)
NRBC BLD AUTO-RTO: 0 /100 WBC (ref 0–0.2)
PLATELET # BLD AUTO: 311 10*3/MM3 (ref 140–450)
PMV BLD AUTO: 9.9 FL (ref 6–12)
RBC # BLD AUTO: 4.15 10*6/MM3 (ref 3.77–5.28)
WBC NRBC COR # BLD AUTO: 9.66 10*3/MM3 (ref 3.4–10.8)

## 2025-05-13 PROCEDURE — 85025 COMPLETE CBC W/AUTO DIFF WBC: CPT

## 2025-05-13 PROCEDURE — 36415 COLL VENOUS BLD VENIPUNCTURE: CPT

## 2025-05-16 LAB — TRYPTASE SERPL-MCNC: 23.6 UG/L (ref 2.2–13.2)

## 2025-07-01 ENCOUNTER — OFFICE VISIT (OUTPATIENT)
Dept: INTERNAL MEDICINE | Facility: CLINIC | Age: 40
End: 2025-07-01
Payer: COMMERCIAL

## 2025-07-01 VITALS
OXYGEN SATURATION: 97 % | HEIGHT: 63 IN | DIASTOLIC BLOOD PRESSURE: 82 MMHG | BODY MASS INDEX: 47.02 KG/M2 | HEART RATE: 79 BPM | WEIGHT: 265.4 LBS | SYSTOLIC BLOOD PRESSURE: 122 MMHG

## 2025-07-01 DIAGNOSIS — E78.2 MIXED HYPERLIPIDEMIA: ICD-10-CM

## 2025-07-01 DIAGNOSIS — E55.9 VITAMIN D DEFICIENCY: ICD-10-CM

## 2025-07-01 DIAGNOSIS — Z00.00 ANNUAL PHYSICAL EXAM: Primary | ICD-10-CM

## 2025-07-01 DIAGNOSIS — E03.9 HYPOTHYROIDISM, UNSPECIFIED TYPE: ICD-10-CM

## 2025-07-01 DIAGNOSIS — I10 PRIMARY HYPERTENSION: ICD-10-CM

## 2025-07-01 DIAGNOSIS — E66.813 CLASS 3 SEVERE OBESITY WITH SERIOUS COMORBIDITY AND BODY MASS INDEX (BMI) OF 45.0 TO 49.9 IN ADULT, UNSPECIFIED OBESITY TYPE: ICD-10-CM

## 2025-07-01 DIAGNOSIS — R41.840 POOR CONCENTRATION: ICD-10-CM

## 2025-07-01 DIAGNOSIS — Z11.59 NEED FOR HEPATITIS C SCREENING TEST: ICD-10-CM

## 2025-07-01 RX ORDER — AMMONIUM LACTATE 12 G/100G
LOTION TOPICAL 2 TIMES DAILY
Qty: 225 G | Refills: 1 | Status: SHIPPED | OUTPATIENT
Start: 2025-07-01

## 2025-07-01 NOTE — PROGRESS NOTES
Shahram Leung M.D.  Internal Medicine  [facility]  [address], [address]  [place] [address]  [phone]      Chief Complaint  Annual Exam    SUBJECTIVE    History of Present Illness    Marly Snyder is a 40 y.o. female with mastocytosis and hypertension who presents to the office today as an established patient that last saw me on 1/3/2025.     History of Present Illness  She reports no new health concerns since her last visit. She has not yet started breast cancer screening and does not have regular menstrual periods. Her gynecologist has tested her hormone levels, which were normal. She experiences spotting more frequently than before. She uses condoms and abstinence for contraception. She is a non-smoker and consumes alcohol moderately, with a glass of wine at dinner occasionally    She is interested in semaglutide for weight loss. She is on spironolactone for acne and requests refills on ammonium lactate.    She has a history of low vitamin D levels, which have remained at the lower end of the normal range even with supplementation. She recently started taking a multivitamin containing 2000 units of vitamin D and is curious about her current levels. She is on a regimen of 50,000 units twice a week.    She has elevated cholesterol levels but is not currently following any specific diet, although she has been gluten-free for nearly a decade. Her exercise routine includes walking, but she acknowledges the need to increase her activity level due to her sedentary job.    She does not monitor her blood pressure at home but plans to start doing so. She is currently on atenolol and spironolactone. She was previously on amlodipine, which was discontinued due to her concerns about her systemic mastocytosis and swelling. HCTZ was also tried but discontinued due to a sulfa allergy. Atenolol was effective initially, and she is considering restarting it as she did not experience any adverse  effects.    She reports no feelings of depression or sadness but suspects she may have ADD due to task aversion, procrastination, chronic lateness, and perceived lack of executive skills. She often feels overwhelmed and chews on her lip when sedentary. She believes her task procrastination interferes with her life. She did not struggle academically growing up and does not exhibit hyperactive behavior or attention difficulties. Once she commits to a task, she can focus and complete it. She acknowledges some anxiety but does not believe it significantly impacts her life. She is interested in neurocognitive testing.        Review of Systems    Allergies   Allergen Reactions    Morphine Anaphylaxis    Sulfa Antibiotics Rash        Outpatient Medications Marked as Taking for the 7/1/25 encounter (Office Visit) with Shahram Leung MD   Medication Sig Dispense Refill    ammonium lactate (LAC-HYDRIN) 12 % lotion Apply  topically to the appropriate area as directed 2 (Two) Times a Day. to affected area(s) 225 g 1    atenolol (TENORMIN) 25 MG tablet Take 1 tablet by mouth Daily. 90 tablet 2    EPINEPHrine (EPIPEN) 0.3 MG/0.3ML solution auto-injector injection Inject 0.3 mL under the skin into the appropriate area as directed 1 (One) Time.      levothyroxine (SYNTHROID, LEVOTHROID) 50 MCG tablet Take 1 tablet by mouth Daily. 90 tablet 1    spironolactone (ALDACTONE) 50 MG tablet Take 2 tablets by mouth Daily.      vitamin D (ERGOCALCIFEROL) 1.25 MG (65540 UT) capsule capsule Take 1 capsule by mouth 2 (Two) Times a Week. 24 capsule 1    [DISCONTINUED] ammonium lactate (LAC-HYDRIN) 12 % lotion Apply  topically to the appropriate area as directed 2 (Two) Times a Day. to affected area(s) 225 g 1        Past Medical History:   Diagnosis Date    History of medical problems 2024    Systemic mastocytosis    Hx of reduction of closed fracture     left arm    Hypertension 2024    Hypothyroidism 2011     Past Surgical History:   Procedure  "Laterality Date    ADENOIDECTOMY  1996    BONE MARROW BIOPSY      TONSILLECTOMY AND ADENOIDECTOMY       Family History   Problem Relation Age of Onset    Hypertension Father 40    Hyperlipidemia Father     Coronary artery disease Father     Cancer Paternal Aunt         Great Aunt - Bone    Anxiety disorder Maternal Grandmother     Arthritis Maternal Grandmother     Anxiety disorder Paternal Grandmother     Depression Paternal Grandmother     Hyperlipidemia Paternal Grandmother     Cancer Paternal Grandfather         lung    Breast cancer Other     reports that she has never smoked. She has never been exposed to tobacco smoke. She has never used smokeless tobacco. She reports current alcohol use of about 3.0 standard drinks of alcohol per week. She reports that she does not use drugs.    OBJECTIVE    Vital Signs:   /82   Pulse 79   Ht 158.8 cm (62.52\")   Wt 120 kg (265 lb 6.4 oz)   SpO2 97%   BMI 47.74 kg/m²     Physical Exam  Constitutional:       Appearance: Normal appearance.   HENT:      Right Ear: Tympanic membrane normal.      Left Ear: Tympanic membrane normal.   Cardiovascular:      Rate and Rhythm: Normal rate and regular rhythm.      Heart sounds: Normal heart sounds. No murmur heard.  Pulmonary:      Effort: Pulmonary effort is normal.      Breath sounds: Normal breath sounds.   Abdominal:      General: Abdomen is flat. There is no distension.      Palpations: Abdomen is soft.      Tenderness: There is no abdominal tenderness.   Musculoskeletal:      Right lower leg: No edema.      Left lower leg: No edema.   Skin:     General: Skin is warm and dry.   Neurological:      Mental Status: She is alert.   Psychiatric:         Mood and Affect: Mood normal.         Behavior: Behavior normal.         Thought Content: Thought content normal.          Physical Exam      The following data was reviewed by: Shahram Leung MD on 07/01/2025:  CMP          6/24/2025    10:10   CMP   Glucose 98    BUN 11.0  "   Creatinine 0.81    EGFR 95.4    Sodium 139    Potassium 4.3    Chloride 104    Calcium 8.7    Total Protein 6.2    Albumin 4.2    Globulin 2.0    Total Bilirubin 0.3    Alkaline Phosphatase 71    AST (SGOT) 15    ALT (SGPT) 22    Albumin/Globulin Ratio 2.1    BUN/Creatinine Ratio 13.6      CBC w/diff          1/14/2025    12:44 5/13/2025    11:53 6/24/2025    10:10   CBC w/Diff   WBC 9.58  9.66  8.04    RBC 4.22  4.15  4.03    Hemoglobin 13.6  13.2  13.0    Hematocrit 39.8  39.7  38.7    MCV 94.3  95.7  96.0    MCH 32.2  31.8  32.3    MCHC 34.2  33.2  33.6    RDW 11.7  11.8  12.0    Platelets 326  311  330    Neutrophil Rel % 52.7  63.6  54.1    Immature Granulocyte Rel % 0.3  0.6     Lymphocyte Rel % 38.9  26.9  37.1    Monocyte Rel % 6.1  6.9  6.7    Eosinophil Rel % 1.9  1.8  1.7    Basophil Rel % 0.1  0.2  0.2      Lipid Panel          6/24/2025    10:10   Lipid Panel   Total Cholesterol 204    Triglycerides 93    HDL Cholesterol 47    VLDL Cholesterol 17    LDL Cholesterol  140      TSH          10/24/2024    08:51 6/24/2025    10:10   TSH   TSH 1.880  1.830      A1C Last 3 Results          6/24/2025    10:10   HGBA1C Last 3 Results   Hemoglobin A1C 5.30                  ASSESSMENT & PLAN        Annual physical exam  -Age and sex appropriate physical exam performed and documented. Updated past medical, family, social and surgical histories as well as allergies and care team list. Addressed care gaps listed in the medical record.  -Encouraged annual dental and vision exams as part of their overall health.  -Encouraged minimum of 30 minutes or more of exercise at a brisk walk or higher 5 days per week combined with a well-balanced diet.  -Immunizations reviewed and updated in EMR.  -Advised that all women who are planning or capable of pregnancy take a daily supplement containing 0.4 to 0.8 mg (400 to 800 ?g) of folic acid.  -Lipid screening:   Lipid Panel          6/24/2025    10:10   Lipid Panel   Total  Cholesterol 204    Triglycerides 93    HDL Cholesterol 47    VLDL Cholesterol 17    LDL Cholesterol  140    -Aspirin for primary or secondary prevention: Not applicable, patient is less than age 50.  -Depression screening: PHQ2 performed and the patient's screen was negative.  -Diabetes screening:  negative screen  -Hypertension screening: Patient with known diagnosis of hypertension and is receiving treatment.  -Colon cancer screening: Patient is less than age 45 and colon cancer screening is not indicated.  -Lung cancer screening: Patient has never smoked.  -Cervical cancer screening: per GYN  -Breast cancer screening: Informed patient that the USPSTF recommends biennial screening mammography for women aged 40 to 74 years.            Mixed hyperlipidemia  - Try decreasing high fat and greasy foods, increasing exercise to a total of 150 minutes weekly at a brisk walk or more, and increasing vegetables.   - On gluten free diet  - Walks for exercise        Hypothyroidism, unspecified type  Taking synthroid 50 mcg daily   Normal TSH levels, appropriate levothyroxine dosage        Primary hypertension  Hypertension is borderline on atenolol and spironolactone (for acne)  For hypertension she was initially started on amlodipine. She had ankle swelling on amlodipine. Patient was advised to start HCTZ but she was concerned because she has sulfa allergy. She is on spironolactone so no BRIAN/ARB due to concern for kyperkalmemia. She was started on atenolol instead.          Vitamin D deficiency  - History of low vitamin D levels, currently taking 50,000 units weekly  - Low normal vitamin D levels even with supplementation  - Importance of monitoring vitamin D levels annually discussed  - Blood test will be ordered to check vitamin D levels    Orders:    Vitamin D,25-Hydroxy    Poor concentration  - worried she has ADHD. States she procrastinates and is late. No issues in school growining. Not figdety. Denies issues paying  attantion or staying on task. Hard to start projects. No issues finishing projects. No issues with anxiety or depression.   - No issues with hyperactivity or paying attention once engaged in tasks  - Neurocognitive testing with a neuropsychologist recommended to confirm diagnosis  - Online evaluation option at [facility] provided  - Potential treatments, including therapy, strategies like setting timers, and medications, will be discussed after evaluation  Orders:    NeuroPsych Testing    Need for hepatitis C screening test    Orders:    Hepatitis C Antibody    Class 3 severe obesity with serious comorbidity and body mass index (BMI) of 45.0 to 49.9 in adult, unspecified obesity type  Patient's (Body mass index is 47.74 kg/m².) indicates that they are morbidly/severely obese (BMI > 40 or > 35 with obesity - related health condition) with health conditions that include hypertension and dyslipidemias . . We discussed the following classes of obesity medications.      Active ingredient is also in diabetes medication.  It is a injection.  It works by activating the GLP-1 receptor in the brain, regulating appetite and caloric intake.  Contraindicated if you have family history of thyroid cancer.  Common reaction is nausea.              Assessment & Plan        Health Maintenance Due   Topic Date Due    TDAP/TD VACCINES (1 - Tdap) Never done    HEPATITIS C SCREENING  Never done    ANNUAL PHYSICAL  12/28/2017    PAP SMEAR  10/17/2021    INFLUENZA VACCINE  07/01/2025        Follow Up  Return in about 6 months (around 1/1/2026) for Recheck.    Patient/family had no further questions at this time and verbalized understanding of the plan discussed today.     Patient or patient representative verbalized consent for the use of Ambient Listening during the visit with  Shahram Leung MD for chart documentation. 7/1/2025  20:33 EDT

## 2025-07-01 NOTE — ASSESSMENT & PLAN NOTE
- History of low vitamin D levels, currently taking 50,000 units weekly  - Low normal vitamin D levels even with supplementation  - Importance of monitoring vitamin D levels annually discussed  - Blood test will be ordered to check vitamin D levels    Orders:    Vitamin D,25-Hydroxy

## 2025-07-01 NOTE — ASSESSMENT & PLAN NOTE
- Try decreasing high fat and greasy foods, increasing exercise to a total of 150 minutes weekly at a brisk walk or more, and increasing vegetables.   - On gluten free diet  - Walks for exercise

## 2025-07-02 LAB
25(OH)D3+25(OH)D2 SERPL-MCNC: 41.4 NG/ML (ref 30–100)
HCV IGG SERPL QL IA: NON REACTIVE

## 2025-08-05 ENCOUNTER — PATIENT MESSAGE (OUTPATIENT)
Dept: INTERNAL MEDICINE | Facility: CLINIC | Age: 40
End: 2025-08-05
Payer: COMMERCIAL

## 2025-08-05 RX ORDER — ERGOCALCIFEROL 1.25 MG/1
50000 CAPSULE, LIQUID FILLED ORAL 2 TIMES WEEKLY
Qty: 24 CAPSULE | Refills: 1 | OUTPATIENT
Start: 2025-08-07

## 2025-08-05 RX ORDER — ERGOCALCIFEROL 1.25 MG/1
50000 CAPSULE, LIQUID FILLED ORAL 2 TIMES WEEKLY
Qty: 24 CAPSULE | Refills: 1 | Status: SHIPPED | OUTPATIENT
Start: 2025-08-07